# Patient Record
Sex: MALE | Race: WHITE | NOT HISPANIC OR LATINO | URBAN - METROPOLITAN AREA
[De-identification: names, ages, dates, MRNs, and addresses within clinical notes are randomized per-mention and may not be internally consistent; named-entity substitution may affect disease eponyms.]

---

## 2018-07-31 ENCOUNTER — EMERGENCY (EMERGENCY)
Facility: HOSPITAL | Age: 83
LOS: 1 days | Discharge: HOME | End: 2018-07-31
Attending: EMERGENCY MEDICINE
Payer: MEDICARE

## 2018-07-31 VITALS
HEART RATE: 88 BPM | DIASTOLIC BLOOD PRESSURE: 82 MMHG | OXYGEN SATURATION: 100 % | SYSTOLIC BLOOD PRESSURE: 176 MMHG | RESPIRATION RATE: 20 BRPM | TEMPERATURE: 98 F

## 2018-07-31 VITALS — HEIGHT: 69 IN | WEIGHT: 160.06 LBS

## 2018-07-31 PROCEDURE — 99283 EMERGENCY DEPT VISIT LOW MDM: CPT

## 2018-07-31 PROCEDURE — 71045 X-RAY EXAM CHEST 1 VIEW: CPT | Mod: 26

## 2018-07-31 PROCEDURE — 99284 EMERGENCY DEPT VISIT MOD MDM: CPT | Mod: 25

## 2018-07-31 PROCEDURE — 70360 X-RAY EXAM OF NECK: CPT

## 2018-07-31 PROCEDURE — 71045 X-RAY EXAM CHEST 1 VIEW: CPT

## 2018-07-31 PROCEDURE — 70360 X-RAY EXAM OF NECK: CPT | Mod: 26

## 2018-07-31 NOTE — ED STATDOCS - PROGRESS NOTE DETAILS
Patient is a 89 y/o male c/o of foreign body swallowed. Patient states he swallowed his bridge and has been having trouble swallowing since. Patient to main further evaluation

## 2018-07-31 NOTE — ED PROVIDER NOTE - OBJECTIVE STATEMENT
A 89 year old male pt presents to the ED c/o choking episode. The pt was eating dinner when he accidentally swallowed his dentures, causing him to experience difficulty breathing and coughing. Pt was able to recover after coughing up his food but was unsure if his dentures were still in his throat. The pt's son was able to fine the dentures in the garbage after the incident. He currently denies any throat pain or difficulty breathing. denies fever. denies HA or neck pain. no chest pain or sob. no abd pain. no n/v/d. no urinary f/u/d. no back pain. no motor or sensory deficits. denies illicit drug use. no recent travel. no rash. no other acute issues symptoms or concerns

## 2018-07-31 NOTE — ED PROVIDER NOTE - MEDICAL DECISION MAKING DETAILS
feeling better return to ed for intractable chest pain sob any overall worsening son came to room and states he found rest of bridge on the floor at pts house thus he likely didn't swallow it and xray shows no foreign body pt tolerating secretions in nad able to swallow fluids without distress

## 2018-07-31 NOTE — ED PROVIDER NOTE - PSH
S/P cataract extraction  surgical correction for glaucoma  S/P prostatectomy  excision basal cell L neck

## 2018-09-28 ENCOUNTER — OUTPATIENT (OUTPATIENT)
Dept: OUTPATIENT SERVICES | Facility: HOSPITAL | Age: 83
LOS: 1 days | End: 2018-09-28
Payer: MEDICARE

## 2018-09-28 DIAGNOSIS — Z51.89 ENCOUNTER FOR OTHER SPECIFIED AFTERCARE: ICD-10-CM

## 2018-09-28 DIAGNOSIS — M25.559 PAIN IN UNSPECIFIED HIP: ICD-10-CM

## 2018-10-30 PROCEDURE — 97161 PT EVAL LOW COMPLEX 20 MIN: CPT

## 2018-10-30 PROCEDURE — 97110 THERAPEUTIC EXERCISES: CPT

## 2018-10-30 PROCEDURE — 97010 HOT OR COLD PACKS THERAPY: CPT

## 2018-10-30 PROCEDURE — G8979: CPT | Mod: CJ

## 2018-10-30 PROCEDURE — G8978: CPT | Mod: CK

## 2018-11-28 ENCOUNTER — TRANSCRIPTION ENCOUNTER (OUTPATIENT)
Age: 83
End: 2018-11-28

## 2018-12-07 ENCOUNTER — TRANSCRIPTION ENCOUNTER (OUTPATIENT)
Age: 83
End: 2018-12-07

## 2019-01-17 ENCOUNTER — APPOINTMENT (OUTPATIENT)
Dept: ORTHOPEDIC SURGERY | Facility: CLINIC | Age: 84
End: 2019-01-17
Payer: MEDICARE

## 2019-01-17 VITALS
WEIGHT: 152 LBS | SYSTOLIC BLOOD PRESSURE: 143 MMHG | HEART RATE: 95 BPM | BODY MASS INDEX: 24.43 KG/M2 | HEIGHT: 66 IN | DIASTOLIC BLOOD PRESSURE: 85 MMHG

## 2019-01-17 DIAGNOSIS — M67.98 UNSPECIFIED DISORDER OF SYNOVIUM AND TENDON, OTHER SITE: ICD-10-CM

## 2019-01-17 PROBLEM — Z00.00 ENCOUNTER FOR PREVENTIVE HEALTH EXAMINATION: Status: ACTIVE | Noted: 2019-01-17

## 2019-01-17 PROCEDURE — 99204 OFFICE O/P NEW MOD 45 MIN: CPT | Mod: 25

## 2019-01-17 PROCEDURE — 20552 NJX 1/MLT TRIGGER POINT 1/2: CPT

## 2019-01-17 NOTE — PHYSICAL EXAM
[de-identified] : Lumbar exam:\par CONSTITUTIONAL: The patient is a very pleasant individual who is well-nourished and who appears stated age. He is accompanied by his son. Ambulates with an antalgic, unsteady gait.\par PSYCHIATRIC: The patient is alert and oriented X 3 and in no apparent distress, and participates with orthopedic evaluation well.\par HEAD: Atraumatic and is nonsyndromic in appearance.\par EENT: No visible thyromegaly, EOMI.\par RESPIRATORY: Respiratory rate is regular, not dyspneic on examination.\par LYMPHATICS: There is no inguinal lymphadenopathy\par INTEGUMENTARY: Skin is clean, dry, and intact about the bilateral lower extremities and lumbar spine.\par VASCULAR: There is brisk capillary refill about the bilateral lower extremities.\par NEUROLOGIC: There are no pathologic reflexes. There is no decrease in sensation of the bilateral lower extremities on Wartenberg pinwheel examination. Deep tendon reflexes are well maintained at 2+/4 of the bilateral lower extremities and are symmetric..\par MUSCULOSKELETAL: There is no visible muscular atrophy. Manual motor strength is somewhat maintained in the bilateral lower extremities. Range of motion of lumbar spine is well maintained. Negative tension sign and straight leg raise bilaterally. Quad extension, ankle dorsiflexion, EHL, plantar flexion, and ankle eversion are well preserved. Normal secondary orthopaedic exam of bilateral knees and ankles.  Tenderness to right greater than left her trochanter. Positive tear form a strain and sprain type characteristics as well as focal tenderness to the gluteal insertion on the right. 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

## 2019-01-17 NOTE — REASON FOR VISIT
[Initial Visit] : an initial visit for [Back Pain] : back pain [Sciatica] : sciatica [Other: ____] : [unfilled]

## 2019-01-17 NOTE — PROCEDURE
[de-identified] : Verbal consent was obtained and all questions, comments and concerns were addressed.  After trigger point in the affected area superficially into affected muscle right piriformis was cleansed with alcohol prep X 3, ethyl chloride was used as local anesthetic.  Under sterile precautions 1cc of 1 percent lidocaine without epinephrine, plus 10 mg ketorolac were instilled into the affected trigger points.  Patient tolerated procedure well. Dry sterile dressing was placed and patient described relief of pain 5 minutes after procedure was performed.\par

## 2019-01-17 NOTE — HISTORY OF PRESENT ILLNESS
[de-identified] : He is status post slip and fall in September of 2018 resulting in severe bilateral buttock pain right greater than left, as well as right lateral hip pain which does radiate to the right thigh and sometimes even into the calf. Pain is exacerbated when he gets up from bed and is made better after he is walking for some time. He did 5 weeks of active physical therapy beginning in the end of September 2018 and continues to do home exercise however her pain continues to rate is 7 or 8 on pain scale on a regular basis. It's described as very sore. He did have an x-ray at an outside facility. He is not having any back pain at this time .  He doesn't past medical history prostate cancer radical prostatectomy occurred approximately 10 years ago. States that he has had no recurrence since then.

## 2019-01-17 NOTE — DISCUSSION/SUMMARY
[Medication Risks Reviewed] : Medication risks reviewed [de-identified] : MRI of the right hip is ordered and I think medically necessary to do pain that continues past 4 months status post fall, possible subacute fracture. Patient also has history of prostate cancer. He did well after trigger point injection. 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 In Heather Tylenol, heat, topicals p.r.n. He'll continue home exercises from physical therapy. I will call him with the results of his MRI.

## 2019-01-18 ENCOUNTER — OUTPATIENT (OUTPATIENT)
Dept: OUTPATIENT SERVICES | Facility: HOSPITAL | Age: 84
LOS: 1 days | End: 2019-01-18

## 2019-01-18 ENCOUNTER — APPOINTMENT (OUTPATIENT)
Dept: MRI IMAGING | Facility: CLINIC | Age: 84
End: 2019-01-18
Payer: MEDICARE

## 2019-01-18 DIAGNOSIS — M67.98 UNSPECIFIED DISORDER OF SYNOVIUM AND TENDON, OTHER SITE: ICD-10-CM

## 2019-01-18 PROCEDURE — 73721 MRI JNT OF LWR EXTRE W/O DYE: CPT | Mod: 26,RT

## 2019-01-25 ENCOUNTER — APPOINTMENT (OUTPATIENT)
Dept: ORTHOPEDIC SURGERY | Facility: CLINIC | Age: 84
End: 2019-01-25
Payer: MEDICARE

## 2019-01-25 ENCOUNTER — APPOINTMENT (OUTPATIENT)
Dept: MRI IMAGING | Facility: CLINIC | Age: 84
End: 2019-01-25
Payer: MEDICARE

## 2019-01-25 ENCOUNTER — OUTPATIENT (OUTPATIENT)
Dept: OUTPATIENT SERVICES | Facility: HOSPITAL | Age: 84
LOS: 1 days | End: 2019-01-25

## 2019-01-25 VITALS
BODY MASS INDEX: 24.43 KG/M2 | WEIGHT: 152 LBS | DIASTOLIC BLOOD PRESSURE: 71 MMHG | SYSTOLIC BLOOD PRESSURE: 124 MMHG | HEART RATE: 86 BPM | HEIGHT: 66 IN

## 2019-01-25 DIAGNOSIS — Z78.9 OTHER SPECIFIED HEALTH STATUS: ICD-10-CM

## 2019-01-25 DIAGNOSIS — Z86.79 PERSONAL HISTORY OF OTHER DISEASES OF THE CIRCULATORY SYSTEM: ICD-10-CM

## 2019-01-25 DIAGNOSIS — Z85.46 PERSONAL HISTORY OF MALIGNANT NEOPLASM OF PROSTATE: ICD-10-CM

## 2019-01-25 DIAGNOSIS — M54.16 RADICULOPATHY, LUMBAR REGION: ICD-10-CM

## 2019-01-25 DIAGNOSIS — Z80.9 FAMILY HISTORY OF MALIGNANT NEOPLASM, UNSPECIFIED: ICD-10-CM

## 2019-01-25 DIAGNOSIS — S32.10XA UNSPECIFIED FRACTURE OF SACRUM, INITIAL ENCOUNTER FOR CLOSED FRACTURE: ICD-10-CM

## 2019-01-25 DIAGNOSIS — M99.83 OTHER BIOMECHANICAL LESIONS OF LUMBAR REGION: ICD-10-CM

## 2019-01-25 DIAGNOSIS — G57.01 LESION OF SCIATIC NERVE, RIGHT LOWER LIMB: ICD-10-CM

## 2019-01-25 PROCEDURE — 72148 MRI LUMBAR SPINE W/O DYE: CPT | Mod: 26

## 2019-01-25 PROCEDURE — 99214 OFFICE O/P EST MOD 30 MIN: CPT | Mod: 25

## 2019-01-25 PROCEDURE — 96372 THER/PROPH/DIAG INJ SC/IM: CPT

## 2019-01-25 PROCEDURE — 72100 X-RAY EXAM L-S SPINE 2/3 VWS: CPT

## 2019-01-25 RX ORDER — OMEPRAZOLE 40 MG/1
40 CAPSULE, DELAYED RELEASE ORAL
Qty: 30 | Refills: 1 | Status: ACTIVE | COMMUNITY
Start: 2019-01-25 | End: 1900-01-01

## 2019-01-25 RX ORDER — NAPROXEN 500 MG/1
500 TABLET ORAL
Qty: 30 | Refills: 0 | Status: ACTIVE | COMMUNITY
Start: 2019-01-25 | End: 1900-01-01

## 2019-01-25 RX ORDER — AMOXICILLIN 875 MG/1
TABLET, FILM COATED ORAL
Refills: 0 | Status: ACTIVE | COMMUNITY

## 2019-01-25 NOTE — HISTORY OF PRESENT ILLNESS
[Standing] : worsened by standing [Recumbency] : relieved by recumbency [de-identified] : 89 year old M presents for review of his hip MRI. He has having some right hip pain. His pain starts in his right buttock and radiates down to his right calf. His pain is at worst from sitting to standing.  [Ataxia] : no ataxia [Incontinence] : no incontinence [Loss of Dexterity] : good dexterity [Urinary Ret.] : no urinary retention

## 2019-01-25 NOTE — ADDENDUM
[FreeTextEntry1] : Documented by Seth Suarez acting as a scribe for JESUS Nieves on 01/25/2019\par All medical record entries made by the Scribe were at my, JESUS Nieves, direction and personally dictated by me on 01/25/2019 . I have reviewed the chart and agree that the record accurately reflects my personal performance of the history, physical exam, assessment and plan. I have also personally directed, reviewed, and agreed with the chart.

## 2019-01-25 NOTE — PHYSICAL EXAM
[Poor Appearance] : well-appearing [Acute Distress] : not in acute distress [Obese] : not obese [de-identified] : CONSTITUTIONAL: The patient is a very pleasant individual who is well-nourished and who appears stated age. presents in a wheelchair and accompanied by his son. \par PSYCHIATRIC: The patient is alert and oriented X 3 and in no apparent distress, and participates with orthopedic evaluation well.\par HEAD: Atraumatic and is nonsyndromic in appearance.\par EENT: No visible thyromegaly, EOMI.\par RESPIRATORY: Respiratory rate is regular, not dyspneic on examination.\par LYMPHATICS: There is no inguinal lymphadenopathy\par INTEGUMENTARY: Skin is clean, dry, and intact about the bilateral lower extremities and lumbar spine.\par VASCULAR: There is brisk capillary refill about the bilateral lower extremities.\par NEUROLOGIC: There are no pathologic reflexes. There is no decrease in sensation of the bilateral lower extremities on Wartenberg pinwheel examination. Deep tendon reflexes are well maintained at 2+/4 of the bilateral lower extremities and are symmetric.\par MUSCULOSKELETAL: There is no visible muscular atrophy. Manual motor strength is well maintained in the bilateral lower extremities. Range of motion of lumbar spine is well maintained. The patient ambulates in a non-myelopathic manner. Negative tension sign and straight leg raise bilaterally. Quad extension, ankle dorsiflexion, EHL, plantar flexion, and ankle eversion are well preserved. Normal secondary orthopaedic exam of bilateral hips, greater trochanteric area, knees and ankles \par \par TTP about lower lumbar spine. pain on lumbar flexion greater than 20 degrees. TTP greater trochanteric on the right. piriformis strain type characteristics on the right. pain on seated to standing across low back and RLE.  [de-identified] : X-ray of the lumbar spine taken today shows loss of lumbar lordosis, increase in intervertebral disc space L4-S1, foraminal stenosis. \par \par MRI of the right hip 1/18/19 at [facility] shows mild .gt bursitis as well as adductor tendinitis, mild arthritis of hip joint, and some end plate bone Hoh edema L2-L3, L3-L4, no hip fx.

## 2019-01-25 NOTE — PROCEDURE
[de-identified] : Verbal consent was obtained and all questions, comments and concerns were addressed. Right buttock was cleansed with alcohol prep X 3, ethyl chloride was used as local anesthetic. Under sterile precautions 30mg of Ketorolac were instilled in to the right buttock under sterile precautions. he tolerated procedure well. Dry sterile dressing was placed and patient described relief of pain 5 minutes after procedure was performed.

## 2019-01-25 NOTE — REVIEW OF SYSTEMS
[Joint Pain] : joint pain [Negative] : Heme/Lymph [Fever] : no fever [Chills] : no chills [Cough] : no cough [SOB on Exertion] : no shortness of breath on exertion

## 2019-01-29 ENCOUNTER — OTHER (OUTPATIENT)
Age: 84
End: 2019-01-29

## 2019-01-29 DIAGNOSIS — M48.062 SPINAL STENOSIS, LUMBAR REGION WITH NEUROGENIC CLAUDICATION: ICD-10-CM

## 2019-01-29 RX ORDER — METHYLPREDNISOLONE 4 MG/1
4 TABLET ORAL
Qty: 1 | Refills: 1 | Status: ACTIVE | COMMUNITY
Start: 2019-01-29 | End: 1900-01-01

## 2019-02-08 ENCOUNTER — APPOINTMENT (OUTPATIENT)
Dept: ORTHOPEDIC SURGERY | Facility: CLINIC | Age: 84
End: 2019-02-08

## 2020-07-05 ENCOUNTER — INPATIENT (INPATIENT)
Facility: HOSPITAL | Age: 85
LOS: 4 days | Discharge: NURSING, REHAB OR LONG-TERM CARE | DRG: 557 | End: 2020-07-10
Attending: HOSPITALIST | Admitting: HOSPITALIST
Payer: MEDICARE

## 2020-07-05 VITALS
RESPIRATION RATE: 18 BRPM | TEMPERATURE: 98 F | OXYGEN SATURATION: 98 % | WEIGHT: 149.91 LBS | DIASTOLIC BLOOD PRESSURE: 86 MMHG | HEART RATE: 87 BPM | SYSTOLIC BLOOD PRESSURE: 135 MMHG

## 2020-07-05 LAB
ANION GAP SERPL CALC-SCNC: 12 MMOL/L — SIGNIFICANT CHANGE UP (ref 5–17)
BASOPHILS # BLD AUTO: 0.04 K/UL — SIGNIFICANT CHANGE UP (ref 0–0.2)
BASOPHILS NFR BLD AUTO: 0.4 % — SIGNIFICANT CHANGE UP (ref 0–2)
BUN SERPL-MCNC: 29 MG/DL — HIGH (ref 8–20)
CALCIUM SERPL-MCNC: 9.6 MG/DL — SIGNIFICANT CHANGE UP (ref 8.6–10.2)
CHLORIDE SERPL-SCNC: 91 MMOL/L — LOW (ref 98–107)
CK SERPL-CCNC: 1851 U/L — HIGH (ref 30–200)
CO2 SERPL-SCNC: 25 MMOL/L — SIGNIFICANT CHANGE UP (ref 22–29)
CREAT SERPL-MCNC: 1 MG/DL — SIGNIFICANT CHANGE UP (ref 0.5–1.3)
EOSINOPHIL # BLD AUTO: 0.37 K/UL — SIGNIFICANT CHANGE UP (ref 0–0.5)
EOSINOPHIL NFR BLD AUTO: 3.8 % — SIGNIFICANT CHANGE UP (ref 0–6)
GLUCOSE SERPL-MCNC: 109 MG/DL — HIGH (ref 70–99)
HCT VFR BLD CALC: 33.1 % — LOW (ref 39–50)
HGB BLD-MCNC: 11.2 G/DL — LOW (ref 13–17)
IMM GRANULOCYTES NFR BLD AUTO: 0.3 % — SIGNIFICANT CHANGE UP (ref 0–1.5)
LYMPHOCYTES # BLD AUTO: 0.82 K/UL — LOW (ref 1–3.3)
LYMPHOCYTES # BLD AUTO: 8.5 % — LOW (ref 13–44)
MCHC RBC-ENTMCNC: 32.1 PG — SIGNIFICANT CHANGE UP (ref 27–34)
MCHC RBC-ENTMCNC: 33.8 GM/DL — SIGNIFICANT CHANGE UP (ref 32–36)
MCV RBC AUTO: 94.8 FL — SIGNIFICANT CHANGE UP (ref 80–100)
MONOCYTES # BLD AUTO: 0.55 K/UL — SIGNIFICANT CHANGE UP (ref 0–0.9)
MONOCYTES NFR BLD AUTO: 5.7 % — SIGNIFICANT CHANGE UP (ref 2–14)
NEUTROPHILS # BLD AUTO: 7.87 K/UL — HIGH (ref 1.8–7.4)
NEUTROPHILS NFR BLD AUTO: 81.3 % — HIGH (ref 43–77)
PLATELET # BLD AUTO: 248 K/UL — SIGNIFICANT CHANGE UP (ref 150–400)
POTASSIUM SERPL-MCNC: 5.1 MMOL/L — SIGNIFICANT CHANGE UP (ref 3.5–5.3)
POTASSIUM SERPL-SCNC: 5.1 MMOL/L — SIGNIFICANT CHANGE UP (ref 3.5–5.3)
RBC # BLD: 3.49 M/UL — LOW (ref 4.2–5.8)
RBC # FLD: 12.3 % — SIGNIFICANT CHANGE UP (ref 10.3–14.5)
SODIUM SERPL-SCNC: 128 MMOL/L — LOW (ref 135–145)
WBC # BLD: 9.68 K/UL — SIGNIFICANT CHANGE UP (ref 3.8–10.5)
WBC # FLD AUTO: 9.68 K/UL — SIGNIFICANT CHANGE UP (ref 3.8–10.5)

## 2020-07-05 PROCEDURE — 73080 X-RAY EXAM OF ELBOW: CPT | Mod: 26,RT

## 2020-07-05 PROCEDURE — 73090 X-RAY EXAM OF FOREARM: CPT | Mod: 26,LT

## 2020-07-05 PROCEDURE — 73060 X-RAY EXAM OF HUMERUS: CPT | Mod: 26,RT

## 2020-07-05 PROCEDURE — 72125 CT NECK SPINE W/O DYE: CPT | Mod: 26

## 2020-07-05 PROCEDURE — 70450 CT HEAD/BRAIN W/O DYE: CPT | Mod: 26

## 2020-07-05 PROCEDURE — 99285 EMERGENCY DEPT VISIT HI MDM: CPT | Mod: CS

## 2020-07-05 PROCEDURE — 73110 X-RAY EXAM OF WRIST: CPT | Mod: 26,RT

## 2020-07-05 RX ORDER — SODIUM CHLORIDE 9 MG/ML
1000 INJECTION INTRAMUSCULAR; INTRAVENOUS; SUBCUTANEOUS ONCE
Refills: 0 | Status: COMPLETED | OUTPATIENT
Start: 2020-07-05 | End: 2020-07-05

## 2020-07-05 NOTE — ED PROVIDER NOTE - NS ED ROS FT
General: Denies fever, chills  HEENT: Denies sensory changes, sore throat  Neck: Denies neck pain, neck stiffness  Resp: Denies coughing, SOB  Cardiovascular: Denies CP, palpitations, LE edema  GI: Denies nausea, vomiting, abdominal pain, diarrhea, constipation, blood in stool  : Denies dysuria, hematuria, frequency, incontinence  MSK: Endorses RUE, hip pain  Neuro: Denies HA, dizziness, numbness, weakness  Skin: Denies rashes

## 2020-07-05 NOTE — ED PROVIDER NOTE - ATTENDING CONTRIBUTION TO CARE
I personally saw the patient with the resident, and completed the key components of the history and physical exam. I then discussed the management plan with the resident.   gen gcs 15 heent + small hematoma parietla region nnon expanding resp clear cardiac no mumnur abd sot neuro: CN II - XII intact, EOMI, PERRL, no papilledema, 5/5 muscle strength x 4 extremities, no sensory deficits, 2+ dtr globally, negative babinski, no ataxic gait, normal FELIX and FNT, normal romberg   passed mabulation trial  return to ed for intractable HA, persistent vomiting, or new onset motor/sensory deficits

## 2020-07-05 NOTE — ED PROVIDER NOTE - CLINICAL SUMMARY MEDICAL DECISION MAKING FREE TEXT BOX
Patient presenting after a fall with unknown down time. Bruising to RUE. Will image head and RUE. Basic labwork to be drawn due to unknown downtime. Patient dealing with chronic hip and thigh pain.

## 2020-07-05 NOTE — ED PROVIDER NOTE - PHYSICAL EXAMINATION
General: Well appearing male in no acute distress  HEENT: Normocephalic, atraumatic. Moist mucous membranes. Oropharynx clear. No lymphadenopathy.  Eyes: No scleral icterus. EOMI. BERTO.  Neck:. Soft and supple. Full ROM without pain. No midline tenderness  Cardiac: Regular rate and regular rhythm. No murmurs, rubs, gallops. Peripheral pulses 2+ and symmetric. No LE edema.  Resp: Lungs CTAB. Speaking in full sentences. No wheezes, rales or rhonchi.  Abd: Soft, non-tender, non-distended. No guarding or rebound. No scars, masses, or lesions.  Back: Spine midline and non-tender. No CVA tenderness.    Skin: Bruising to R elbow, forearm, wrist.  Neuro: AO x 3. 5/5 strength all extremities. Sensation to light touch intact. General: Well appearing male in no acute distress  HEENT: Normocephalic, atraumatic. Moist mucous membranes. Oropharynx clear. No lymphadenopathy.  Eyes: No scleral icterus. EOMI. BERTO.  Neck:. Soft and supple. Full ROM without pain. No midline tenderness  Cardiac: Regular rate and regular rhythm. No murmurs, rubs, gallops. Peripheral pulses 2+ and symmetric. No LE edema.  Resp: Lungs CTAB. Speaking in full sentences. No wheezes, rales or rhonchi.  Abd: Soft, non-tender, non-distended. No guarding or rebound. No scars, masses, or lesions.  MSK: Tenderness to palpation of RUE. Tenderness to palpation of thighs bilaterally.  Skin: Bruising to R elbow, forearm, wrist.  Neuro: AO x 3. 5/5 strength all extremities. Sensation to light touch intact.

## 2020-07-05 NOTE — ED PROVIDER NOTE - PROGRESS NOTE DETAILS
Spoke with pt's son. [phone]. Last saw patient yesterday and there were no issues. Found patient on floor in front of bathroom only saturated diaper on. Pt has chronic bilateral hip pain. Has had MRI, pain mgmt. Takes BP meds and low dose ASA. Pt lives alone. Son and daughter assist patient. New injury to R arm. Patient seemed a little confused to son. Believes patient had been down for at least 3 hours. States that patient would be safe to d/c to home should workup be negative. Attempted to ambulate patient. Unable to stand with assistance. Will keep for PT eval in AM. Labwork showing elevated CK. To give IVF for hydration. To place in Obs. Spoke with son regarding patient. Explained that patient should stay in hospital for PT eval. Son understands.

## 2020-07-05 NOTE — ED ADULT NURSE NOTE - OBJECTIVE STATEMENT
patient A&Ox2, alert to self and situation. BIBA s/P fall, found on floor at home by son. patient states he usually ambulates steadily at home with a cane. reports tripping and falling over a rug earlier today. bruising noted to right shoulder and arm.

## 2020-07-05 NOTE — ED PROVIDER NOTE - OBJECTIVE STATEMENT
Pt is a 89yo M who had a fall earlier today. Pt states that he tripped over a rug and that he could not get up. Patient was found by son several hours later. Patient endorses chronic bilateral hip and thigh pain for which he sees many doctors. Patient endorses RUE pain. Denies palpitations or syncope. Denies CP, SOB, headache, confusion, nausea, vomiting, weakness.

## 2020-07-05 NOTE — ED ADULT TRIAGE NOTE - CHIEF COMPLAINT QUOTE
as per ems patient was found on floor by son, ems states that patient has not been seen today by son, unknown last known well. patient with complaints of pain to left hip, patient with bruising to shoulder and arms. patient denies hitting his head denies any blood thinners. patient states that he thinks he tripped over the rug,

## 2020-07-06 DIAGNOSIS — Z86.69 PERSONAL HISTORY OF OTHER DISEASES OF THE NERVOUS SYSTEM AND SENSE ORGANS: Chronic | ICD-10-CM

## 2020-07-06 DIAGNOSIS — R41.82 ALTERED MENTAL STATUS, UNSPECIFIED: ICD-10-CM

## 2020-07-06 DIAGNOSIS — Z94.7 CORNEAL TRANSPLANT STATUS: Chronic | ICD-10-CM

## 2020-07-06 LAB
ANION GAP SERPL CALC-SCNC: 13 MMOL/L — SIGNIFICANT CHANGE UP (ref 5–17)
APPEARANCE UR: CLEAR — SIGNIFICANT CHANGE UP
BACTERIA # UR AUTO: ABNORMAL
BILIRUB UR-MCNC: NEGATIVE — SIGNIFICANT CHANGE UP
BUN SERPL-MCNC: 31 MG/DL — HIGH (ref 8–20)
CALCIUM SERPL-MCNC: 8.9 MG/DL — SIGNIFICANT CHANGE UP (ref 8.6–10.2)
CHLORIDE SERPL-SCNC: 94 MMOL/L — LOW (ref 98–107)
CK MB CFR SERPL CALC: 19.6 NG/ML — HIGH (ref 0–6.7)
CK MB CFR SERPL CALC: 26.5 NG/ML — HIGH (ref 0–6.7)
CK SERPL-CCNC: 1240 U/L — HIGH (ref 30–200)
CO2 SERPL-SCNC: 22 MMOL/L — SIGNIFICANT CHANGE UP (ref 22–29)
COLOR SPEC: YELLOW — SIGNIFICANT CHANGE UP
CREAT SERPL-MCNC: 1.02 MG/DL — SIGNIFICANT CHANGE UP (ref 0.5–1.3)
DIFF PNL FLD: ABNORMAL
EPI CELLS # UR: SIGNIFICANT CHANGE UP
GLUCOSE SERPL-MCNC: 99 MG/DL — SIGNIFICANT CHANGE UP (ref 70–99)
GLUCOSE UR QL: NEGATIVE MG/DL — SIGNIFICANT CHANGE UP
KETONES UR-MCNC: ABNORMAL
LEUKOCYTE ESTERASE UR-ACNC: NEGATIVE — SIGNIFICANT CHANGE UP
NITRITE UR-MCNC: NEGATIVE — SIGNIFICANT CHANGE UP
OSMOLALITY SERPL: 293 MOSMOL/KG — SIGNIFICANT CHANGE UP (ref 280–301)
OSMOLALITY SERPL: 297 MOSMOL/KG — SIGNIFICANT CHANGE UP (ref 280–301)
OSMOLALITY UR: 587 MOSM/KG — SIGNIFICANT CHANGE UP (ref 300–1000)
PH UR: 5 — SIGNIFICANT CHANGE UP (ref 5–8)
POTASSIUM SERPL-MCNC: 4.6 MMOL/L — SIGNIFICANT CHANGE UP (ref 3.5–5.3)
POTASSIUM SERPL-SCNC: 4.6 MMOL/L — SIGNIFICANT CHANGE UP (ref 3.5–5.3)
PROT UR-MCNC: 100 MG/DL
RBC CASTS # UR COMP ASSIST: SIGNIFICANT CHANGE UP /HPF (ref 0–4)
SODIUM SERPL-SCNC: 129 MMOL/L — LOW (ref 135–145)
SODIUM UR-SCNC: 39 MMOL/L — SIGNIFICANT CHANGE UP
SP GR SPEC: 1.02 — SIGNIFICANT CHANGE UP (ref 1.01–1.02)
TSH SERPL-MCNC: 2.67 UIU/ML — SIGNIFICANT CHANGE UP (ref 0.27–4.2)
UROBILINOGEN FLD QL: NEGATIVE MG/DL — SIGNIFICANT CHANGE UP
WBC UR QL: SIGNIFICANT CHANGE UP

## 2020-07-06 PROCEDURE — 73522 X-RAY EXAM HIPS BI 3-4 VIEWS: CPT | Mod: 26

## 2020-07-06 PROCEDURE — 99223 1ST HOSP IP/OBS HIGH 75: CPT | Mod: AI

## 2020-07-06 PROCEDURE — 99234 HOSP IP/OBS SM DT SF/LOW 45: CPT | Mod: CS

## 2020-07-06 PROCEDURE — 71046 X-RAY EXAM CHEST 2 VIEWS: CPT | Mod: 26,CS

## 2020-07-06 RX ORDER — SODIUM CHLORIDE 9 MG/ML
1000 INJECTION INTRAMUSCULAR; INTRAVENOUS; SUBCUTANEOUS
Refills: 0 | Status: DISCONTINUED | OUTPATIENT
Start: 2020-07-06 | End: 2020-07-07

## 2020-07-06 RX ORDER — ASPIRIN/CALCIUM CARB/MAGNESIUM 324 MG
81 TABLET ORAL DAILY
Refills: 0 | Status: DISCONTINUED | OUTPATIENT
Start: 2020-07-06 | End: 2020-07-10

## 2020-07-06 RX ORDER — LOSARTAN POTASSIUM 100 MG/1
1 TABLET, FILM COATED ORAL
Qty: 0 | Refills: 0 | DISCHARGE

## 2020-07-06 RX ORDER — ASPIRIN/CALCIUM CARB/MAGNESIUM 324 MG
1 TABLET ORAL
Qty: 0 | Refills: 0 | DISCHARGE

## 2020-07-06 RX ORDER — LOSARTAN POTASSIUM 100 MG/1
50 TABLET, FILM COATED ORAL DAILY
Refills: 0 | Status: DISCONTINUED | OUTPATIENT
Start: 2020-07-06 | End: 2020-07-07

## 2020-07-06 RX ORDER — SENNA PLUS 8.6 MG/1
2 TABLET ORAL AT BEDTIME
Refills: 0 | Status: DISCONTINUED | OUTPATIENT
Start: 2020-07-06 | End: 2020-07-10

## 2020-07-06 RX ORDER — ENOXAPARIN SODIUM 100 MG/ML
40 INJECTION SUBCUTANEOUS DAILY
Refills: 0 | Status: DISCONTINUED | OUTPATIENT
Start: 2020-07-06 | End: 2020-07-10

## 2020-07-06 RX ORDER — DESMOPRESSIN ACETATE 0.1 MG/1
0.2 TABLET ORAL ONCE
Refills: 0 | Status: DISCONTINUED | OUTPATIENT
Start: 2020-07-06 | End: 2020-07-06

## 2020-07-06 RX ORDER — POLYETHYLENE GLYCOL 3350 17 G/17G
17 POWDER, FOR SOLUTION ORAL DAILY
Refills: 0 | Status: DISCONTINUED | OUTPATIENT
Start: 2020-07-06 | End: 2020-07-10

## 2020-07-06 RX ORDER — DESMOPRESSIN ACETATE 0.1 MG/1
2 TABLET ORAL ONCE
Refills: 0 | Status: COMPLETED | OUTPATIENT
Start: 2020-07-06 | End: 2020-07-06

## 2020-07-06 RX ORDER — HALOPERIDOL DECANOATE 100 MG/ML
2.5 INJECTION INTRAMUSCULAR ONCE
Refills: 0 | Status: COMPLETED | OUTPATIENT
Start: 2020-07-06 | End: 2020-07-06

## 2020-07-06 RX ADMIN — Medication 1 TABLET(S): at 17:15

## 2020-07-06 RX ADMIN — HALOPERIDOL DECANOATE 2.5 MILLIGRAM(S): 100 INJECTION INTRAMUSCULAR at 01:17

## 2020-07-06 RX ADMIN — SODIUM CHLORIDE 75 MILLILITER(S): 9 INJECTION INTRAMUSCULAR; INTRAVENOUS; SUBCUTANEOUS at 16:25

## 2020-07-06 RX ADMIN — SODIUM CHLORIDE 2000 MILLILITER(S): 9 INJECTION INTRAMUSCULAR; INTRAVENOUS; SUBCUTANEOUS at 00:54

## 2020-07-06 RX ADMIN — POLYETHYLENE GLYCOL 3350 17 GRAM(S): 17 POWDER, FOR SOLUTION ORAL at 16:28

## 2020-07-06 RX ADMIN — LOSARTAN POTASSIUM 50 MILLIGRAM(S): 100 TABLET, FILM COATED ORAL at 06:25

## 2020-07-06 RX ADMIN — ENOXAPARIN SODIUM 40 MILLIGRAM(S): 100 INJECTION SUBCUTANEOUS at 17:15

## 2020-07-06 RX ADMIN — Medication 81 MILLIGRAM(S): at 12:38

## 2020-07-06 RX ADMIN — DESMOPRESSIN ACETATE 2 MICROGRAM(S): 0.1 TABLET ORAL at 09:03

## 2020-07-06 NOTE — CHART NOTE - NSCHARTNOTEFT_GEN_A_CORE
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  SON HAS ALREADY PUT THINGS IN PLACE TO HAVE 24/7 SUPERVISION FOR HIS DAD AS HE WAS  NOTED TO BE DECLINING RECENTLY.  THE SON- CL AND THE DAUGHTER- JESSE ARE BOTH GOING TO BE ASSUMING CAR FOR THEIR DAD UNITL THEIR COUSIN ARRIVES WHOM WILL BE MOVING IN WITH THE PATIENT AND ALSO ASSISITNG IN CARE AND SUPERVISION.   NO SW NEEDS NOTED AT THIS TIME/

## 2020-07-06 NOTE — H&P ADULT - NSICDXPASTSURGICALHX_GEN_ALL_CORE_FT
PAST SURGICAL HISTORY:  H/O: glaucoma     Post corneal transplant     S/P cataract extraction surgical correction for glaucoma    S/P prostatectomy excision basal cell L neck

## 2020-07-06 NOTE — ED ADULT NURSE REASSESSMENT NOTE - NSIMPLEMENTINTERV_GEN_ALL_ED
Implemented All Fall with Harm Risk Interventions:  [place] to call system. Call bell, personal items and telephone within reach. Instruct patient to call for assistance. Room bathroom lighting operational. Non-slip footwear when patient is off stretcher. Physically safe environment: no spills, clutter or unnecessary equipment. Stretcher in lowest position, wheels locked, appropriate side rails in place. Provide visual cue, wrist band, yellow gown, etc. Monitor gait and stability. Monitor for mental status changes and reorient to person, place, and time. Review medications for side effects contributing to fall risk. Reinforce activity limits and safety measures with patient and family. Provide visual clues: red socks.
Implemented All Fall with Harm Risk Interventions:  [place] to call system. Call bell, personal items and telephone within reach. Instruct patient to call for assistance. Room bathroom lighting operational. Non-slip footwear when patient is off stretcher. Physically safe environment: no spills, clutter or unnecessary equipment. Stretcher in lowest position, wheels locked, appropriate side rails in place. Provide visual cue, wrist band, yellow gown, etc. Monitor gait and stability. Monitor for mental status changes and reorient to person, place, and time. Review medications for side effects contributing to fall risk. Reinforce activity limits and safety measures with patient and family. Provide visual clues: red socks.

## 2020-07-06 NOTE — H&P ADULT - NSHPREVIEWOFSYSTEMS_GEN_ALL_CORE
Review of Systems:  CONSTITUTIONAL: No fevers or chills; +weakness  EYES/ENT: No visual changes;  No vertigo or throat pain   NECK: No pain or stiffness  RESPIRATORY: No cough, wheezing, hemoptysis; No shortness of breath,   CARDIOVASCULAR: No chest pain or palpitations  GASTROINTESTINAL: No abdominal or epigastric pain. No nausea, vomiting, or hematemesis; No diarrhea or constipation.   GENITOURINARY: No dysuria, frequency or hematuria  NEUROLOGICAL: No numbness or weakness  MSK +hip pain  SKIN: No itching, burning, rashes, or lesions   All other review of systems is negative unless indicated above

## 2020-07-06 NOTE — PROVIDER CONTACT NOTE (OTHER) - ASSESSMENT
Pt does not follow pain scale, denies pain but demonstrates moan with movement of right LE, specifically the right hip. Pt places his hand on his right groin. Pt requires max assist w/ mobility, stands with PT assist, see eval for details.

## 2020-07-06 NOTE — H&P ADULT - NSHPPHYSICALEXAM_GEN_ALL_CORE
Vital Signs Last 24 Hrs  T(F): 98.1 (06 Jul 2020 15:41), Max: 98.1 (06 Jul 2020 06:15)  HR: 90 (06 Jul 2020 15:41) (84 - 90)  BP: 169/79 (06 Jul 2020 15:41) (135/86 - 175/84)  RR: 18 (06 Jul 2020 15:41) (18 - 18)  SpO2: 96% (06 Jul 2020 15:41) (96% - 99%)    Physical Exam:  Constitutional: NAD, awake and alert, well-developed  Neck: Soft and supple, No LAD, No JVD  Respiratory: cta b/l no wheezing no rhonchi  Cardiovascular: +s1/s2 no edema b/l le  Gastrointestinal: soft nt nd bs+  Vascular: 2+ peripheral pulses  Neurological: A/O x 3, no focal deficits  Musculoskeletal: 5/5 strength b/l upper and lower extremities  : Contraindicated  Breasts: Contraindicated  Rectal: Contraindicated

## 2020-07-06 NOTE — ED ADULT NURSE REASSESSMENT NOTE - GENERAL PATIENT STATE
comfortable appearance
comfortable appearance/resting/sleeping/cooperative/smiling/interactive
comfortable appearance/cooperative/resting/sleeping/smiling/interactive
comfortable appearance/resting/sleeping
comfortable appearance/resting/sleeping/smiling/interactive/cooperative

## 2020-07-06 NOTE — PROVIDER CONTACT NOTE (OTHER) - RECOMMENDATIONS
subacute rehabilitation. Pt requires more detailed social history, unable to obtain at the time of eval due to cognition.

## 2020-07-06 NOTE — ED CDU PROVIDER DISPOSITION NOTE - ATTENDING CONTRIBUTION TO CARE
placed on observation for PT and CM evaluation after trip and fall.  Found to be very confused this morning on rounds stating "something is not right, feeling that something is being cut off".  Patient not oriented to place time or situation.  Labs reviewed and noted to have hyponatremia and elevated CPK.  Clinical examination suggest euvolemic hyponatremia:  [place] hyponatremia protocol initiated.  Given IV fluids for mild rhabdomyolysis.  No improvement in sodium level with DDAVP .  Admitted for further treatment
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
## 2020-07-06 NOTE — ED CDU PROVIDER INITIAL DAY NOTE - PHYSICAL EXAMINATION
General: Well appearing male in no acute distress  HEENT: Normocephalic, atraumatic. Moist mucous membranes. Oropharynx clear. No lymphadenopathy.  Eyes: No scleral icterus. EOMI. BERTO.  Neck:. Soft and supple. Full ROM without pain. No midline tenderness  Cardiac: Regular rate and regular rhythm. No murmurs, rubs, gallops. Peripheral pulses 2+ and symmetric. No LE edema.  Resp: Lungs CTAB. Speaking in full sentences. No wheezes, rales or rhonchi.  Abd: Soft, non-tender, non-distended. No guarding or rebound. No scars, masses, or lesions.  MSK: Tenderness to palpation of RUE. Tenderness to palpation of thighs bilaterally.  Skin: Bruising to R elbow, forearm, wrist.  Neuro: AO x 3. 5/5 strength all extremities. Sensation to light touch intact.

## 2020-07-06 NOTE — ED ADULT NURSE REASSESSMENT NOTE - NS ED NURSE REASSESS COMMENT FT1
Dr. Cuevas and myself attempted to ambulate patient with 2 person assist, unable to ambulate with steady gait. PT consult to be ordered for AM. son updated with plan of care. will continue to monitor.
pt cleaned and changed by SNAs. pt found to have blanchable redness noted to sacral area. no open wounds noted. will continue to reassess.
pt states he does not eat breakfast so he is not hungry. pt drank coffee, tolerated po intake well. will continue to reassess.
report given to FER Kamara in ED observation unit. pt transported to CDU6 in stable condition by RN.
assumed care of pt @ 0726. received pt sleeping on stretcher, easily arousable at this time, alert and oriented to self and time only, no acute distress. offers no complaints at this time. bruising noted to b/l UE. pt able to follow simple commands, remains calm and cooperative. will continue to reassess.
patient becoming increasingly confused at this time, attempted to get up without assistance and threatening to kick/hit staff when they approach him. unable to ambulate at this time. JESUS Becker called to bedside, patient medicated and moved closer to nursing station for protection.
Patient resting in stretcher, VSS at this time. No s/s of apparent distress noted. Patient currently alert to self and time, but not to situation. No pain/discomfort noted. Medicated per home scheduled medications, tolerated PO intake well. Will continue to monitor.
Received report from FER Tatum. Pt moved to CDU for observation.

## 2020-07-06 NOTE — ED CDU PROVIDER DISPOSITION NOTE - CLINICAL COURSE
89 y/o M who had a fall earlier today. Pt states that he tripped over a rug and that he could not get up. Patient was found by son several hours later. Patient endorses chronic bilateral hip and thigh pain. Pt placed in observation for PT/SW evaluation. Pt found to have AMS with hyponatremia and rhabdomyolysis. Will be admitted for further workup.

## 2020-07-06 NOTE — H&P ADULT - NSICDXPASTMEDICALHX_GEN_ALL_CORE_FT
PAST MEDICAL HISTORY:  Basal cell cancer     Deep vein thrombosis (DVT) - previously on blood thihners however taken off when had surgerys then never restarted    Hypertension     Prostate ca

## 2020-07-06 NOTE — ED CDU PROVIDER INITIAL DAY NOTE - OBJECTIVE STATEMENT
Pt is a 89yo M who had a fall earlier today. Pt states that he tripped over a rug and that he could not get up. Patient was found by son several hours later. Patient endorses chronic bilateral hip and thigh pain for which he sees many doctors. Patient endorses RUE pain. Denies palpitations or syncope. Denies CP, SOB, headache, confusion, nausea, vomiting, weakness.

## 2020-07-06 NOTE — PROVIDER CONTACT NOTE (OTHER) - BACKGROUND
Background and social hx unclear. Pt lethargic, arouses to voice but confused to situation and unable to recall anything prior.

## 2020-07-06 NOTE — ED ADULT NURSE REASSESSMENT NOTE - COMFORT CARE
repositioned/side rails up/po fluids offered/plan of care explained
assisted to bathroom/plan of care explained/wait time explained/meal provided/po fluids offered
meal provided/wait time explained/assisted to bathroom/po fluids offered/plan of care explained
meal provided/assisted with bathroom and hygiene needs/wait time explained/plan of care explained/po fluids offered

## 2020-07-06 NOTE — H&P ADULT - ASSESSMENT
89 year old male with pmh of basal cell cancer, and prostate cancer coming to hospital after being brought in by son post fall. per patient states fell over one step and remembers event but now doing ok denies loc. spoke to son states unsure how long father was on floor. states father has been having constipation and hip pain for past few weeks. states has been having some memory issues as well but has not followed up with neuro yet. no cp nausea vomiting or diarrhea.    #s/p fall - mechanical  - admit to medicine   - found to have rhabdomyolysis w/ hyponatremia- present on admission  - monitor viral  - images as above ; hip xray pending  - patient w/ hip pain being worked up outpatient by Dr Salvador was to have MRI on Wednesday   - pt consult   - ivf  - nephro consult pending     #hyponatremia  - probable 2/2 dehydration w/ rhabdo  - ivf  - monitor   - check urine na and osm; check serum osm    #dementia?  - follow up outpatient w/ neuro    #HTN- essential   - monitor blood pressure   - losartan    #s/p prostates and basal cell cancer  - follow up w/ heme onc outpatient     #dvt prophylaxis  - lovenox SC   IMPROVE VTE Individual Risk Assessment  RISK                                                                Points  [  ] Previous VTE                                                  3  [  ] Thrombophilia                                               2  [  ] Lower limb paralysis                                      2        (unable to hold up >15 seconds)    [  ] Current Cancer                                              2         (within 6 months)  [  ] Immobilization > 24 hrs                                1  [  ] ICU/CCU stay > 24 hours                              1  [x  ] Age > 60                                                      1    IMPROVE VTE Score ____1_____  IMPROVE Score 0-1: Low Risk, No VTE prophylaxis required for most patients, encourage ambulation.   IMPROVE Score 2-3: At risk, pharmacologic VTE prophylaxis is indicated for most patients (in the absence of a contraindication)  IMPROVE Score > or = 4: High Risk, pharmacologic VTE prophylaxis is indicated for most patients (in the absence of a contraindication)

## 2020-07-06 NOTE — PHYSICAL THERAPY INITIAL EVALUATION ADULT - ADDITIONAL COMMENTS
Pt lethargic, answer most questions incorrectly. Pt unable to give social history. Pt believes he is in the doctors office, and cannot recall his own name at the time of eval. Pt arouses to voice and touch.

## 2020-07-06 NOTE — PROVIDER CONTACT NOTE (OTHER) - ACTION/TREATMENT ORDERED:
Pt placed on program, 2-3xwk, 2 week goals. Min assist bed mobs, transfers with RW, min assist 50ft with RW.

## 2020-07-06 NOTE — ED CDU PROVIDER INITIAL DAY NOTE - MEDICAL DECISION MAKING DETAILS
Patient to be kept on observation for PT evaluation in the morning. States he ambulates with cane or walker at baseline but unable to stand with assistance.

## 2020-07-06 NOTE — PROVIDER CONTACT NOTE (OTHER) - SITUATION
Pt attempted to be seen for PT eval, arouses to voice, following commands but confused in response to questions. Pt will with active bed rest order, RN aware. PT to f/u when appropriate.
Pt presents to [facility] s/p fall, found by son.

## 2020-07-06 NOTE — ED CDU PROVIDER INITIAL DAY NOTE - ATTENDING CONTRIBUTION TO CARE
I personally saw the patient with the resident, and completed the key components of the history and physical exam. I then discussed the management plan with the resident.   gen in nad resp clear cardia cnomurmur abd soft nt neuro no latarazing deficitgs  now with trouble ambulating concern lives by self will obs for pt sw evaluation agree with resdient plan of care

## 2020-07-06 NOTE — H&P ADULT - HISTORY OF PRESENT ILLNESS
History obtained from patient son [phone] History obtained from patient son [phone]    89 year old male with pmh of basal cell cancer, and prostate cancer coming to hospital after being brought in by son post fall. per patient states fell over one step and remembers event but now doing ok denies loc. spoke to son states unsure how long father was on floor. states father has been having constipation and hip pain for past few weeks. states has been having some memory issues as well but has not followed up with neuro yet. no cp nausea vomiting or diarrhea.

## 2020-07-07 LAB
ANION GAP SERPL CALC-SCNC: 13 MMOL/L — SIGNIFICANT CHANGE UP (ref 5–17)
BASOPHILS # BLD AUTO: 0.05 K/UL — SIGNIFICANT CHANGE UP (ref 0–0.2)
BASOPHILS NFR BLD AUTO: 0.6 % — SIGNIFICANT CHANGE UP (ref 0–2)
BUN SERPL-MCNC: 31 MG/DL — HIGH (ref 8–20)
CALCIUM SERPL-MCNC: 8.6 MG/DL — SIGNIFICANT CHANGE UP (ref 8.6–10.2)
CHLORIDE SERPL-SCNC: 95 MMOL/L — LOW (ref 98–107)
CK MB CFR SERPL CALC: 25 NG/ML — HIGH (ref 0–6.7)
CK SERPL-CCNC: 1225 U/L — HIGH (ref 30–200)
CO2 SERPL-SCNC: 23 MMOL/L — SIGNIFICANT CHANGE UP (ref 22–29)
CREAT SERPL-MCNC: 1.02 MG/DL — SIGNIFICANT CHANGE UP (ref 0.5–1.3)
CULTURE RESULTS: NO GROWTH — SIGNIFICANT CHANGE UP
EOSINOPHIL # BLD AUTO: 1.5 K/UL — HIGH (ref 0–0.5)
EOSINOPHIL NFR BLD AUTO: 17.4 % — HIGH (ref 0–6)
GLUCOSE SERPL-MCNC: 97 MG/DL — SIGNIFICANT CHANGE UP (ref 70–99)
HCT VFR BLD CALC: 30.6 % — LOW (ref 39–50)
HGB BLD-MCNC: 10.2 G/DL — LOW (ref 13–17)
IMM GRANULOCYTES NFR BLD AUTO: 0.2 % — SIGNIFICANT CHANGE UP (ref 0–1.5)
LYMPHOCYTES # BLD AUTO: 0.98 K/UL — LOW (ref 1–3.3)
LYMPHOCYTES # BLD AUTO: 11.3 % — LOW (ref 13–44)
MCHC RBC-ENTMCNC: 31.9 PG — SIGNIFICANT CHANGE UP (ref 27–34)
MCHC RBC-ENTMCNC: 33.3 GM/DL — SIGNIFICANT CHANGE UP (ref 32–36)
MCV RBC AUTO: 95.6 FL — SIGNIFICANT CHANGE UP (ref 80–100)
MONOCYTES # BLD AUTO: 0.55 K/UL — SIGNIFICANT CHANGE UP (ref 0–0.9)
MONOCYTES NFR BLD AUTO: 6.4 % — SIGNIFICANT CHANGE UP (ref 2–14)
NEUTROPHILS # BLD AUTO: 5.54 K/UL — SIGNIFICANT CHANGE UP (ref 1.8–7.4)
NEUTROPHILS NFR BLD AUTO: 64.1 % — SIGNIFICANT CHANGE UP (ref 43–77)
PLATELET # BLD AUTO: 251 K/UL — SIGNIFICANT CHANGE UP (ref 150–400)
POTASSIUM SERPL-MCNC: 4.6 MMOL/L — SIGNIFICANT CHANGE UP (ref 3.5–5.3)
POTASSIUM SERPL-SCNC: 4.6 MMOL/L — SIGNIFICANT CHANGE UP (ref 3.5–5.3)
RBC # BLD: 3.2 M/UL — LOW (ref 4.2–5.8)
RBC # FLD: 12.4 % — SIGNIFICANT CHANGE UP (ref 10.3–14.5)
SARS-COV-2 IGG SERPL QL IA: NEGATIVE — SIGNIFICANT CHANGE UP
SARS-COV-2 IGM SERPL IA-ACNC: <0.1 INDEX — SIGNIFICANT CHANGE UP
SARS-COV-2 RNA SPEC QL NAA+PROBE: SIGNIFICANT CHANGE UP
SODIUM SERPL-SCNC: 131 MMOL/L — LOW (ref 135–145)
SPECIMEN SOURCE: SIGNIFICANT CHANGE UP
WBC # BLD: 8.64 K/UL — SIGNIFICANT CHANGE UP (ref 3.8–10.5)
WBC # FLD AUTO: 8.64 K/UL — SIGNIFICANT CHANGE UP (ref 3.8–10.5)

## 2020-07-07 PROCEDURE — 99233 SBSQ HOSP IP/OBS HIGH 50: CPT

## 2020-07-07 RX ORDER — HYDRALAZINE HCL 50 MG
10 TABLET ORAL ONCE
Refills: 0 | Status: COMPLETED | OUTPATIENT
Start: 2020-07-07 | End: 2020-07-07

## 2020-07-07 RX ORDER — AMLODIPINE BESYLATE 2.5 MG/1
2.5 TABLET ORAL DAILY
Refills: 0 | Status: DISCONTINUED | OUTPATIENT
Start: 2020-07-07 | End: 2020-07-07

## 2020-07-07 RX ORDER — LOSARTAN POTASSIUM 100 MG/1
50 TABLET, FILM COATED ORAL DAILY
Refills: 0 | Status: DISCONTINUED | OUTPATIENT
Start: 2020-07-07 | End: 2020-07-10

## 2020-07-07 RX ORDER — AMLODIPINE BESYLATE 2.5 MG/1
2.5 TABLET ORAL DAILY
Refills: 0 | Status: DISCONTINUED | OUTPATIENT
Start: 2020-07-07 | End: 2020-07-10

## 2020-07-07 RX ORDER — SODIUM CHLORIDE 9 MG/ML
1000 INJECTION INTRAMUSCULAR; INTRAVENOUS; SUBCUTANEOUS
Refills: 0 | Status: DISCONTINUED | OUTPATIENT
Start: 2020-07-07 | End: 2020-07-08

## 2020-07-07 RX ADMIN — LOSARTAN POTASSIUM 50 MILLIGRAM(S): 100 TABLET, FILM COATED ORAL at 05:14

## 2020-07-07 RX ADMIN — AMLODIPINE BESYLATE 2.5 MILLIGRAM(S): 2.5 TABLET ORAL at 18:09

## 2020-07-07 RX ADMIN — Medication 1 TABLET(S): at 10:32

## 2020-07-07 RX ADMIN — Medication 81 MILLIGRAM(S): at 10:32

## 2020-07-07 RX ADMIN — POLYETHYLENE GLYCOL 3350 17 GRAM(S): 17 POWDER, FOR SOLUTION ORAL at 10:32

## 2020-07-07 RX ADMIN — SODIUM CHLORIDE 125 MILLILITER(S): 9 INJECTION INTRAMUSCULAR; INTRAVENOUS; SUBCUTANEOUS at 22:48

## 2020-07-07 RX ADMIN — Medication 10 MILLIGRAM(S): at 12:38

## 2020-07-07 RX ADMIN — SODIUM CHLORIDE 125 MILLILITER(S): 9 INJECTION INTRAMUSCULAR; INTRAVENOUS; SUBCUTANEOUS at 11:48

## 2020-07-07 RX ADMIN — ENOXAPARIN SODIUM 40 MILLIGRAM(S): 100 INJECTION SUBCUTANEOUS at 10:31

## 2020-07-07 NOTE — PATIENT PROFILE ADULT - DISASTER - LONGEST PERIOD TOBACCO-FREE
Had therapy. Resting in recliner. Medicated for pain with dilaudid IV. R shoulder bandage dry and intact. 40 years

## 2020-07-07 NOTE — PROGRESS NOTE ADULT - ASSESSMENT
89 year old male with pmh of basal cell cancer, and prostate cancer coming to hospital after being brought in by son s/p fall. Patient states he fell over one step and remembers event, denies LOC. Pt's son states he is unsure how long father was on floor. States father has been having constipation and hip pain for past few weeks. States has been having some memory issues as well but has not followed up with neuro yet.     1) s/p fall - mechanical  - Xrays negative for acute fracture/dislocation  - Hip pain being worked up outpatient by Dr Salvador, was to have MRI on Wednesday   - PT eval pending    2) Rhabdomyolysis w/ hyponatremia- present on admission  - C/w IVF  - CK improving  - Monitor    3) Hyponatremia  - Likely secondary to dehydration w/ rhabdo  - C/w IVF  - Na improving  - Nephro consult pending   - Urine Na and osm and serum osm all WNL  - Monitor BMP    4) Dementia?  - To f/u outpatient w/ neuro    5) HTN - essential   - Noted w/ elevated BPs  - Losartan 50 mg  - Norvasc 2.5 mg added  - Monitor BP    6) s/p prostate and basal cell cancer  - Follow up w/ heme onc outpatient     7) Constipation  - Pt w/ BM this AM  - Continue with current bowel regimen    #DVT prophylaxis: Lovenox SC     DISPO: Nephrology consult. Likely DC 24-48 hrs. 89 year old male with pmh of basal cell cancer, and prostate cancer coming to hospital after being brought in by son s/p fall. Patient states he fell over one step and remembers event, denies LOC. Pt's son states he is unsure how long father was on floor. States father has been having constipation and hip pain for past few weeks. States has been having some memory issues as well but has not followed up with neuro yet.     1) s/p fall - mechanical  - Xrays negative for acute fracture/dislocation  - Hip pain being worked up outpatient by Dr Salvador, was to have MRI on Wednesday   - PT eval pending    2) Rhabdomyolysis w/ hyponatremia- present on admission  - C/w IVF  - CK improving  - Monitor    3) Hyponatremia   - Likely secondary to dehydration w/ rhabdo  - C/w IVF  - Na improving  - Nephro consult pending   - Urine Na and osm and serum osm all WNL  - Monitor BMP    4) Dementia?  - To f/u outpatient w/ neuro    5) HTN - essential   - Noted w/ elevated BPs  - Losartan 50 mg  - Norvasc 2.5 mg added  - Monitor BP    6) s/p prostate and basal cell cancer  - Follow up w/ heme onc outpatient     7) Constipation  - Pt w/ BM this AM  - Continue with current bowel regimen    #DVT prophylaxis: Lovenox SC     DISPO: Nephrology consult. Likely DC 24-48 hrs. 89 year old male with pmh of basal cell cancer, and prostate cancer coming to hospital after being brought in by son s/p fall. Patient states he fell over one step and remembers event, denies LOC. Pt's son states he is unsure how long father was on floor. States father has been having constipation and hip pain for past few weeks. States has been having some memory issues as well but has not followed up with neuro yet.     1) s/p fall - mechanical  - Xrays negative for acute fracture/dislocation  - Hip pain being worked up outpatient by Dr Salvador, was to have MRI on Wednesday     2) Rhabdomyolysis w/ hyponatremia- present on admission  - C/w IVF  - CK improving  - Monitor    3) Hyponatremia   - Likely secondary to dehydration w/ rhabdo  - C/w IVF  - Na improving  - Nephro consult pending   - Urine Na and osm and serum osm all WNL  - Monitor BMP    4) Dementia?  - To f/u outpatient w/ neuro    5) HTN - essential   - Noted w/ elevated BPs  - Losartan 50 mg  - Norvasc 2.5 mg added  - Monitor BP    6) s/p prostate and basal cell cancer  - Follow up w/ heme onc outpatient     7) Constipation  - Pt w/ BM this AM  - Continue with current bowel regimen    #DVT prophylaxis: Lovenox SC     DISPO: Nephrology consult. Likely DC 24-48 hrs.

## 2020-07-07 NOTE — PROGRESS NOTE ADULT - ATTENDING COMMENTS
I have personally seen and examined patient on the above date.  I discussed the case with JESUS Phelps and I agree with findings and plan as detailed per note above, which I have amended where appropriate.      nephro consult noted- fluid restriction started     case d/w patient son Donn [phone]-> hospital course to date reviewed all questions answered

## 2020-07-07 NOTE — CONSULT NOTE ADULT - ASSESSMENT
Hyponatremia:   Pt responding to IV hydration so has a component of hypovolemia  - oral fluid restrict  - avoid excessive hypotonic fluids  - monitor labs

## 2020-07-07 NOTE — CONSULT NOTE ADULT - SUBJECTIVE AND OBJECTIVE BOX
HPI: 89 year PMH + prostate cancer +HTN who was brought to the ED by his son for falls and progressive mental decline.  He was found to have hyponatremia for which we are called to evaluate. Pt cannot give any further details or insights to his medical history or presentation.      PAST MEDICAL & SURGICAL HISTORY:  Deep vein thrombosis (DVT): - previously on blood thihners however taken off when had surgerys then never restarted  Hypertension  Basal cell cancer  Prostate ca  H/O: glaucoma  Post corneal transplant  S/P cataract extraction: surgical correction for glaucoma  S/P prostatectomy: excision basal cell L neck      FAMILY HISTORY:  FH: lung cancer      Social History:  No tobacco; no etoh nor     MEDICATIONS  (STANDING):  aspirin enteric coated 81 milliGRAM(s) Oral daily  enoxaparin Injectable 40 milliGRAM(s) SubCutaneous daily  losartan 50 milliGRAM(s) Oral daily  multivitamin 1 Tablet(s) Oral daily  polyethylene glycol 3350 17 Gram(s) Oral daily  sodium chloride 0.9%. 1000 milliLiter(s) (125 mL/Hr) IV Continuous <Continuous>    MEDICATIONS  (PRN):  senna 2 Tablet(s) Oral at bedtime PRN Constipation      Allergies    IV Iodine dye (Hypotension)  No Known Drug Allergies    Intolerances        REVIEW OF SYSTEMS:    CONSTITUTIONAL: No fever, weight loss, + fatigue  EYES: No eye pain, visual disturbances, or discharge  ENMT:  No difficulty hearing, tinnitus, vertigo; No sinus or throat pain  NECK: No pain or stiffness  RESPIRATORY: No cough, wheezing, chills or hemoptysis; No shortness of breath  CARDIOVASCULAR: No chest pain, palpitations, dizziness, or leg swelling  GASTROINTESTINAL: No abdominal or epigastric pain. No nausea, vomiting, or hematemesis; No diarrhea or constipation. No melena or hematochezia.  GENITOURINARY: No dysuria, frequency, hematuria, or incontinence  NEUROLOGICAL: No headaches, memory loss, loss of strength, numbness, or tremors  SKIN: No itching, burning, rashes, or lesions   MUSCULOSKELETAL: No joint pain or swelling; No muscle, back, or extremity pain        Vital Signs Last 24 Hrs  T(C): 36.6 (2020 11:20), Max: 37.1 (2020 22:23)  T(F): 97.8 (2020 11:20), Max: 98.7 (2020 22:23)  HR: 88 (2020 11:20) (83 - 95)  BP: 182/83 (2020 11:20) (166/75 - 182/83)  BP(mean): --  RR: 20 (2020 11:20) (18 - 20)  SpO2: 96% (2020 11:20) (96% - 100%)    PHYSICAL EXAM:    GENERAL: Elderly, frail  HEAD:  Atraumatic, Normocephalic  EYES: EOMI, PERRLA, conjunctiva and sclera clear  ENMT: Dry mucous membranes  NECK: Supple, No JVD  NERVOUS SYSTEM:  Awake and alert, non focal  CHEST/LUNG: Clear bilaterally  HEART: Regular rate and rhythm; No rub  ABDOMEN: Soft, Nontender, Nondistended; BS+  EXTREMITIES:  2+ Peripheral Pulses, No LE edema  SKIN: No rashes or lesions      LABS:                        10.2   8.64  )-----------( 251      ( 2020 07:42 )             30.6     07-07    131<L>  |  95<L>  |  31.0<H>  ----------------------------<  97  4.6   |  23.0  |  1.02    Sodium, Serum: 128 mmol/L (20 @ 23:25)  Creatine Kinase, Serum: 1225 U/L (20 @ 07:42)    Osmolality, Random Urine: 587 mosm/kg (20 @ 20:33)  Sodium, Random Urine: 39: Reference Ranges have NOT been established for random urine analytes due  to variability in fluid intake and concentration. mmol/L (20 @ 20:33)          Ca    8.6      2020 07:42        Urinalysis Basic - ( 2020 12:21 )    Color: Yellow / Appearance: Clear / S.020 / pH: x  Gluc: x / Ketone: Small  / Bili: Negative / Urobili: Negative mg/dL   Blood: x / Protein: 100 mg/dL / Nitrite: Negative   Leuk Esterase: Negative / RBC: 0-2 /HPF / WBC 0-2   Sq Epi: x / Non Sq Epi: Occasional / Bacteria: Occasional          RADIOLOGY & ADDITIONAL TESTS:  < from: Xray Chest 2 Views PA/Lat (20 @ 12:20) >   EXAM:  XR CHEST PA LAT 2V                          PROCEDURE DATE:  2020          INTERPRETATION:  PA and lateral chest on 2020 12:21 PM. Patient had a fall. Covid virus testing is pending. There is history of CHF. There is history of prostate cancer and hypertension.    Heart is magnified by technique.    The lung fields and pleural surfaces are unremarkable.    No gross fractures are seen.    Chest is similar to 2018.    IMPRESSION: No acute finding or change.    < end of copied text >      < from: CT Head No Cont (20 @ 23:16) >     EXAM:  CT BRAIN                         EXAM:  CT CERVICAL SPINE                          PROCEDURE DATE:  2020          INTERPRETATION:  EXAMINATION: CT CERVICAL SPINE, CT HEAD    DATE: 2020 11:16 PM    INDICATION: Trauma, fall    COMPARISON: None available.    TECHNIQUE: Thin section noncontrast axial images were obtained through the head. CT dose lowering techniques were used, to include: automated exposure control, adjustment for patient size, and or use of iterative reconstruction.    FINDINGS:    CT HEAD:    Mild to moderate atrophy and chronic microvascular ischemic change. No edema or evidence of acute, territorial infarct. No acute intracranial hemorrhage. No midline shift or hydrocephalus.    Extensive paranasal sinus mucosal thickening with air-fluid levels in the bilateral maxillary sinuses. Small scalp contusion with the posterior vertex. Calvarium is intact.    CT CERVICAL SPINE:    No acute fracture. 3-4 mm anterolisthesis at C7-T1. No prevertebral edema.    Extensive degenerative change throughout the cervical spine, most prominent at C5-6 where a combination of disc osteophyte complex and bilateral facet and uncinate hypertrophy results in narrowing of the central canal to 7 mm, and severe left foraminal stenosis. Severe left foraminal stenosis at C2-3 and C3-4. Noncalcified Jalyn along the posterior aspect of the odontoid process, newly completely effaces the anterior epidural space without definitive mass effect on the cervical medullary junction.    Biapical pleural parenchymal scarring in the lung apices. Calcified plaque in the carotid arteries.    IMPRESSION:  1. scalp hematoma without acute intracranial CT abnormality.  2. No cervical spine fracture or traumatic malalignment.    < end of copied text >

## 2020-07-08 LAB
ANION GAP SERPL CALC-SCNC: 13 MMOL/L — SIGNIFICANT CHANGE UP (ref 5–17)
BUN SERPL-MCNC: 21 MG/DL — HIGH (ref 8–20)
CALCIUM SERPL-MCNC: 8.5 MG/DL — LOW (ref 8.6–10.2)
CHLORIDE SERPL-SCNC: 95 MMOL/L — LOW (ref 98–107)
CK MB CFR SERPL CALC: 21.9 NG/ML — HIGH (ref 0–6.7)
CK SERPL-CCNC: 900 U/L — HIGH (ref 30–200)
CO2 SERPL-SCNC: 22 MMOL/L — SIGNIFICANT CHANGE UP (ref 22–29)
CREAT SERPL-MCNC: 0.83 MG/DL — SIGNIFICANT CHANGE UP (ref 0.5–1.3)
GLUCOSE SERPL-MCNC: 102 MG/DL — HIGH (ref 70–99)
HCT VFR BLD CALC: 29.9 % — LOW (ref 39–50)
HGB BLD-MCNC: 10 G/DL — LOW (ref 13–17)
MCHC RBC-ENTMCNC: 31.3 PG — SIGNIFICANT CHANGE UP (ref 27–34)
MCHC RBC-ENTMCNC: 33.4 GM/DL — SIGNIFICANT CHANGE UP (ref 32–36)
MCV RBC AUTO: 93.4 FL — SIGNIFICANT CHANGE UP (ref 80–100)
PLATELET # BLD AUTO: 237 K/UL — SIGNIFICANT CHANGE UP (ref 150–400)
POTASSIUM SERPL-MCNC: 4.4 MMOL/L — SIGNIFICANT CHANGE UP (ref 3.5–5.3)
POTASSIUM SERPL-SCNC: 4.4 MMOL/L — SIGNIFICANT CHANGE UP (ref 3.5–5.3)
RBC # BLD: 3.2 M/UL — LOW (ref 4.2–5.8)
RBC # FLD: 12.2 % — SIGNIFICANT CHANGE UP (ref 10.3–14.5)
SODIUM SERPL-SCNC: 130 MMOL/L — LOW (ref 135–145)
URATE SERPL-MCNC: 4.1 MG/DL — SIGNIFICANT CHANGE UP (ref 3.4–7)
WBC # BLD: 8.39 K/UL — SIGNIFICANT CHANGE UP (ref 3.8–10.5)
WBC # FLD AUTO: 8.39 K/UL — SIGNIFICANT CHANGE UP (ref 3.8–10.5)

## 2020-07-08 PROCEDURE — 99233 SBSQ HOSP IP/OBS HIGH 50: CPT

## 2020-07-08 RX ORDER — SODIUM CHLORIDE 9 MG/ML
1000 INJECTION INTRAMUSCULAR; INTRAVENOUS; SUBCUTANEOUS
Refills: 0 | Status: DISCONTINUED | OUTPATIENT
Start: 2020-07-08 | End: 2020-07-10

## 2020-07-08 RX ADMIN — Medication 81 MILLIGRAM(S): at 12:22

## 2020-07-08 RX ADMIN — SODIUM CHLORIDE 150 MILLILITER(S): 9 INJECTION INTRAMUSCULAR; INTRAVENOUS; SUBCUTANEOUS at 18:46

## 2020-07-08 NOTE — SWALLOW BEDSIDE ASSESSMENT ADULT - SWALLOW EVAL: DIAGNOSIS
Moderate ora dysphagia confounded by reduced cognition/confusion at this time. Cannot r/o pharyngeal dysphagia with thin liquids.

## 2020-07-08 NOTE — SWALLOW BEDSIDE ASSESSMENT ADULT - ASR SWALLOW ASPIRATION MONITOR
cough/fever/change of breathing pattern/oral hygiene/throat clearing/position upright (90Y)/pneumonia/upper respiratory infection/gurgly voice

## 2020-07-08 NOTE — SWALLOW BEDSIDE ASSESSMENT ADULT - SLP PERTINENT HISTORY OF CURRENT PROBLEM
89 year old male with pmh of basal cell cancer, and prostate cancer coming to hospital after being brought in by son s/p fall. Patient states he fell over one step and remembers event, denies LOC. Pt's son states he is unsure how long father was on floor. States father has been having constipation and hip pain for past few weeks. States has been having some memory issues as well but has not followed up with neuro yet.

## 2020-07-08 NOTE — SWALLOW BEDSIDE ASSESSMENT ADULT - ORAL PHASE
Within functional limits mildly reduced atteniton to bolus cleared with sip of liquid, mildly reduced attention to bolus/Lingual stasis

## 2020-07-08 NOTE — SWALLOW BEDSIDE ASSESSMENT ADULT - COMMENTS
Additional solid trials not adminstered at this time Overall swallow function - impacted by reduced cognition/confusion.  Pt unable to hold cup/spoon himself, bringing a fisted hand to his mouth and trying to "drink."

## 2020-07-08 NOTE — PROGRESS NOTE ADULT - ASSESSMENT
89 year old male with pmh of basal cell cancer, and prostate cancer coming to hospital after being brought in by son s/p fall. Patient states he fell over one step and remembers event, denies LOC. Pt's son states he is unsure how long father was on floor. States father has been having constipation and hip pain for past few weeks. States has been having some memory issues as well but has not followed up with neuro yet.     #s/p fall - mechanical  - Xrays negative for acute fracture/dislocation  - Hip pain being worked up outpatient by Dr Salvador, was to have MRI on Wednesday   - PT eval pending    #Rhabdomyolysis w/ hyponatremia- present on admission  - C/w IVF  - CK improving  - Monitor    #Hyponatremia   - Likely secondary to dehydration w/ rhabdo  - C/w IVF  - monitor na  - Nephro consult appreciated   - Urine Na and osm and serum osm all WNL  - Monitor BMP  - fluid restriction oral    #Dementia?  - To f/u outpatient w/ neuro    #HTN - essential   - Noted w/ elevated BPs  - Losartan, norvasc  - Monitor BP    #s/p prostate and basal cell cancer  - Follow up w/ heme onc outpatient     #Constipation  - Continue with current bowel regimen    #DVT prophylaxis  - Lovenox SC     case d/w patient son Donn [phone]-> hospital course to date reviewed all questions answered . 89 year old male with pmh of basal cell cancer, and prostate cancer coming to hospital after being brought in by son s/p fall. Patient states he fell over one step and remembers event, denies LOC. Pt's son states he is unsure how long father was on floor. States father has been having constipation and hip pain for past few weeks. States has been having some memory issues as well but has not followed up with neuro yet.     #s/p fall - mechanical  - Xrays negative for acute fracture/dislocation  - Hip pain being worked up outpatient by Dr Salvador, was to have MRI on Wednesday     #Rhabdomyolysis w/ hyponatremia- present on admission  - C/w IVF  - CK improving  - Monitor    #Hyponatremia   - Likely secondary to dehydration w/ rhabdo  - C/w IVF  - monitor na  - Nephro consult appreciated   - Urine Na and osm and serum osm all WNL  - Monitor BMP  - fluid restriction oral    #Dementia?  - To f/u outpatient w/ neuro    #HTN - essential   - Noted w/ elevated BPs  - Losartan, norvasc  - Monitor BP    #s/p prostate and basal cell cancer  - Follow up w/ heme onc outpatient     #Constipation  - Continue with current bowel regimen    #DVT prophylaxis  - Lovenox SC     case d/w patient son Donn [phone]-> hospital course to date reviewed all questions answered .

## 2020-07-08 NOTE — SWALLOW BEDSIDE ASSESSMENT ADULT - SWALLOW EVAL: RECOMMENDED FEEDING/EATING TECHNIQUES
maintain upright posture during/after eating for 30 mins/oral hygiene/position upright (90 degrees)/small sips/bites/no straws/crush medication (when feasible)

## 2020-07-08 NOTE — CDI QUERY NOTE - NSCDIOTHERTXTBX_GEN_ALL_CORE_HH
SUPPORTING DOCUMENTATION / EVIDENCE:  s/p mechanical fall at home, on floor sev hrs.  adm with AMS, hyponatremia and rhabdomyolysis  Na 128      Altered Mental Status is a non-specific term. Can you clarify, in progress notes and dc summary, the likely underlying cause?     Document etiology of the altered mental status such as:     AMS likely secondary to metabolic encephalopathy due to hyponatremia/ dehydration  AMS likely due to _____________  other  not clinically significant      Thank you

## 2020-07-08 NOTE — PROGRESS NOTE ADULT - ASSESSMENT
Hyponatremia: serum Na slowly improving  Pt responding to IV hydration so has a component of hypovolemia  - oral fluid restrict  - avoid excessive hypotonic fluids  - monitor labs    Will follow

## 2020-07-08 NOTE — CHART NOTE - NSCHARTNOTEFT_GEN_A_CORE
called and spoke to patient son advised patient more confused and agitated today - he previously stated that happens to father when in hospital-> advised son started enhanced supervision- son would like to avoid pharmacologic measures for agitation - advised would escalate to 1:1 for safety next and continue to keep him updated on status called by rn for patient with increased agitation and confusion    called and spoke to patient son advised patient more confused and agitated today - he previously stated that happens to father when in hospital-> advised son started enhanced supervision- son would like to avoid pharmacologic measures for agitation - advised would escalate to 1:1 for safety next and continue to keep him updated on status. advised current status probably 2/2 hospital delerium

## 2020-07-08 NOTE — SWALLOW BEDSIDE ASSESSMENT ADULT - SLP GENERAL OBSERVATIONS
Pt received A&A in bed, 0x1, significant confusion/reduced cognition, intermittent following of simple commands, confused expressive language, unable to provide any history, nonverbal pain 0/10 pre/post evaluation

## 2020-07-09 ENCOUNTER — TRANSCRIPTION ENCOUNTER (OUTPATIENT)
Age: 85
End: 2020-07-09

## 2020-07-09 LAB
ANION GAP SERPL CALC-SCNC: 12 MMOL/L — SIGNIFICANT CHANGE UP (ref 5–17)
BUN SERPL-MCNC: 17 MG/DL — SIGNIFICANT CHANGE UP (ref 8–20)
CALCIUM SERPL-MCNC: 8.3 MG/DL — LOW (ref 8.6–10.2)
CHLORIDE SERPL-SCNC: 94 MMOL/L — LOW (ref 98–107)
CK MB CFR SERPL CALC: 13.5 NG/ML — HIGH (ref 0–6.7)
CK SERPL-CCNC: 505 U/L — HIGH (ref 30–200)
CO2 SERPL-SCNC: 24 MMOL/L — SIGNIFICANT CHANGE UP (ref 22–29)
CREAT SERPL-MCNC: 0.73 MG/DL — SIGNIFICANT CHANGE UP (ref 0.5–1.3)
GLUCOSE SERPL-MCNC: 99 MG/DL — SIGNIFICANT CHANGE UP (ref 70–99)
POTASSIUM SERPL-MCNC: 4.1 MMOL/L — SIGNIFICANT CHANGE UP (ref 3.5–5.3)
POTASSIUM SERPL-SCNC: 4.1 MMOL/L — SIGNIFICANT CHANGE UP (ref 3.5–5.3)
SODIUM SERPL-SCNC: 130 MMOL/L — LOW (ref 135–145)

## 2020-07-09 PROCEDURE — 99232 SBSQ HOSP IP/OBS MODERATE 35: CPT

## 2020-07-09 PROCEDURE — 70450 CT HEAD/BRAIN W/O DYE: CPT | Mod: 26

## 2020-07-09 RX ORDER — ERYTHROMYCIN BASE 5 MG/GRAM
1 OINTMENT (GRAM) OPHTHALMIC (EYE)
Refills: 0 | Status: DISCONTINUED | OUTPATIENT
Start: 2020-07-09 | End: 2020-07-10

## 2020-07-09 RX ORDER — AMLODIPINE BESYLATE 2.5 MG/1
1 TABLET ORAL
Qty: 0 | Refills: 0 | DISCHARGE
Start: 2020-07-09

## 2020-07-09 RX ORDER — POLYETHYLENE GLYCOL 3350 17 G/17G
17 POWDER, FOR SOLUTION ORAL
Qty: 0 | Refills: 0 | DISCHARGE
Start: 2020-07-09

## 2020-07-09 RX ORDER — SENNA PLUS 8.6 MG/1
2 TABLET ORAL
Qty: 0 | Refills: 0 | DISCHARGE
Start: 2020-07-09

## 2020-07-09 RX ADMIN — Medication 1 APPLICATION(S): at 21:23

## 2020-07-09 RX ADMIN — SODIUM CHLORIDE 150 MILLILITER(S): 9 INJECTION INTRAMUSCULAR; INTRAVENOUS; SUBCUTANEOUS at 14:11

## 2020-07-09 RX ADMIN — Medication 1 APPLICATION(S): at 17:46

## 2020-07-09 RX ADMIN — Medication 81 MILLIGRAM(S): at 14:11

## 2020-07-09 RX ADMIN — SODIUM CHLORIDE 150 MILLILITER(S): 9 INJECTION INTRAMUSCULAR; INTRAVENOUS; SUBCUTANEOUS at 06:07

## 2020-07-09 NOTE — DISCHARGE NOTE PROVIDER - NSDCMRMEDTOKEN_GEN_ALL_CORE_FT
amLODIPine 2.5 mg oral tablet: 1 tab(s) orally once a day  aspirin 81 mg oral tablet: 1 tab(s) orally once a day  losartan 50 mg oral tablet: 1 tab(s) orally once a day  Multiple Vitamins oral tablet: 1 tab(s) orally once a day  polyethylene glycol 3350 oral powder for reconstitution: 17 gram(s) orally once a day  senna oral tablet: 2 tab(s) orally once a day (at bedtime), As needed, Constipation

## 2020-07-09 NOTE — DISCHARGE NOTE PROVIDER - HOSPITAL COURSE
89 year old male with pmh of basal cell cancer, and prostate cancer coming to hospital s/p fall. patient found to have rhabdomyolysis and hyponatremia while admitted and started on ivf. patient also seen by nephrology. patient now being d/c in stable condition with down trending ck levels        Time spent on patients discharge 32 minutes

## 2020-07-09 NOTE — DISCHARGE NOTE PROVIDER - CARE PROVIDERS DIRECT ADDRESSES
,DirectAddress_Unknown ,DirectAddress_Unknown,[email or web address]

## 2020-07-09 NOTE — CHART NOTE - NSCHARTNOTEFT_GEN_A_CORE
called by rn for patient with increased lethargy however has not slept in past few days ; also called by rn for patient with crusting on eyes    check ct head to r/o acute cva    start erythromycin ointment

## 2020-07-09 NOTE — DISCHARGE NOTE PROVIDER - CARE PROVIDER_API CALL
Bandar Sorto  NEPHROLOGY  [address] [place] [address]  Phone: [phone]  Fax: [phone]  Follow Up Time: Bandar Sroto  NEPHROLOGY  [address] [place] [address]  Phone: [phone]  Fax: [phone]  Follow Up Time:     [place] [address]  Phone: [phone]  Fax: [phone]  Follow Up Time:

## 2020-07-09 NOTE — DISCHARGE NOTE PROVIDER - PROVIDER TOKENS
PROVIDER:[TOKEN:[[id number]]] PROVIDER:[TOKEN:[[id number]]],PROVIDER:[TOKEN:[[id number]]]

## 2020-07-09 NOTE — PROGRESS NOTE ADULT - ASSESSMENT
89 year old male with pmh of basal cell cancer, and prostate cancer coming to hospital after being brought in by son s/p fall. Patient states he fell over one step and remembers event, denies LOC. Pt's son states he is unsure how long father was on floor. States father has been having constipation and hip pain for past few weeks. States has been having some memory issues as well but has not followed up with neuro yet.     #s/p fall - mechanical  - Xrays negative for acute fracture/dislocation  - Hip pain being worked up outpatient by Dr Salvador, was to have MRI on Wednesday   - PT reeval    #Rhabdomyolysis w/ hyponatremia- present on admission  - C/w IVF  - CK improving  - Monitor    #Hyponatremia   - Likely secondary to dehydration w/ rhabdo  - C/w IVF  - monitor na  - Nephro consult appreciated   - Urine Na and osm and serum osm all WNL  - Monitor BMP  - fluid restriction oral    #Dementia?  - To f/u outpatient w/ neuro    #HTN - essential   - Noted w/ elevated BPs  - Losartan, norvasc  - Monitor BP    #s/p prostate and basal cell cancer  - Follow up w/ heme onc outpatient     #Constipation  - Continue with current bowel regimen    #DVT prophylaxis  - Lovenox SC     case d/w patient son Donn [phone]-> hospital course to date reviewed all questions answered; pt reeval pending 89 year old male with pmh of basal cell cancer, and prostate cancer coming to hospital after being brought in by son s/p fall. Patient states he fell over one step and remembers event, denies LOC. Pt's son states he is unsure how long father was on floor. States father has been having constipation and hip pain for past few weeks. States has been having some memory issues as well but has not followed up with neuro yet.     #s/p fall - mechanical- present on admission  - w/ ams likely 2/2 hyponatremia, dehydration and probable dementia present on admission  - Xrays negative for acute fracture/dislocation  - Hip pain being worked up outpatient by Dr Salvador, was to have MRI on Wednesday   - PT reeval    #Rhabdomyolysis w/ hyponatremia- present on admission  - C/w IVF  - CK improving  - Monitor    #Hyponatremia   - Likely secondary to dehydration w/ rhabdo  - C/w IVF  - monitor na  - Nephro consult appreciated   - Urine Na and osm and serum osm all WNL  - Monitor BMP  - fluid restriction oral    #Dementia?  - To f/u outpatient w/ neuro    #HTN - essential   - Noted w/ elevated BPs  - Losartan, norvasc  - Monitor BP    #s/p prostate and basal cell cancer  - Follow up w/ heme onc outpatient     #Constipation  - Continue with current bowel regimen    #DVT prophylaxis  - Lovenox SC     case d/w patient son Donn [phone]-> hospital course to date reviewed all questions answered; pt reeval pending

## 2020-07-09 NOTE — DISCHARGE NOTE PROVIDER - NSDCCPCAREPLAN_GEN_ALL_CORE_FT
PRINCIPAL DISCHARGE DIAGNOSIS  Diagnosis: Rhabdomyolysis  Assessment and Plan of Treatment: - oral hydration   - follow up with nephrology and pmd      SECONDARY DISCHARGE DIAGNOSES  Diagnosis: Basal cell carcinoma (BCC)  Assessment and Plan of Treatment: - follow up with hematology and oncology given hx of basal cell carcinoma and prostate cancer    Diagnosis: Dementia  Assessment and Plan of Treatment: - follow up with neurology for dementia work up    Diagnosis: Hyponatremia  Assessment and Plan of Treatment:

## 2020-07-10 ENCOUNTER — TRANSCRIPTION ENCOUNTER (OUTPATIENT)
Age: 85
End: 2020-07-10

## 2020-07-10 VITALS
HEART RATE: 89 BPM | RESPIRATION RATE: 18 BRPM | SYSTOLIC BLOOD PRESSURE: 156 MMHG | DIASTOLIC BLOOD PRESSURE: 81 MMHG | OXYGEN SATURATION: 96 %

## 2020-07-10 LAB
ANION GAP SERPL CALC-SCNC: 12 MMOL/L — SIGNIFICANT CHANGE UP (ref 5–17)
BUN SERPL-MCNC: 21 MG/DL — HIGH (ref 8–20)
CALCIUM SERPL-MCNC: 8.6 MG/DL — SIGNIFICANT CHANGE UP (ref 8.6–10.2)
CHLORIDE SERPL-SCNC: 98 MMOL/L — SIGNIFICANT CHANGE UP (ref 98–107)
CK MB CFR SERPL CALC: 6.3 NG/ML — SIGNIFICANT CHANGE UP (ref 0–6.7)
CK SERPL-CCNC: 184 U/L — SIGNIFICANT CHANGE UP (ref 30–200)
CO2 SERPL-SCNC: 22 MMOL/L — SIGNIFICANT CHANGE UP (ref 22–29)
CORTICOSTEROID BINDING GLOBULIN RESULT: 1.5 MG/DL — LOW
CORTIS F/TOTAL MFR SERPL: 50 % — SIGNIFICANT CHANGE UP
CORTIS SERPL-MCNC: 20 UG/DL — HIGH
CORTISOL, FREE RESULT: 10 UG/DL — HIGH
CREAT SERPL-MCNC: 0.92 MG/DL — SIGNIFICANT CHANGE UP (ref 0.5–1.3)
GLUCOSE SERPL-MCNC: 94 MG/DL — SIGNIFICANT CHANGE UP (ref 70–99)
HCT VFR BLD CALC: 29.2 % — LOW (ref 39–50)
HGB BLD-MCNC: 9.8 G/DL — LOW (ref 13–17)
MCHC RBC-ENTMCNC: 32 PG — SIGNIFICANT CHANGE UP (ref 27–34)
MCHC RBC-ENTMCNC: 33.6 GM/DL — SIGNIFICANT CHANGE UP (ref 32–36)
MCV RBC AUTO: 95.4 FL — SIGNIFICANT CHANGE UP (ref 80–100)
PLATELET # BLD AUTO: 227 K/UL — SIGNIFICANT CHANGE UP (ref 150–400)
POTASSIUM SERPL-MCNC: 3.8 MMOL/L — SIGNIFICANT CHANGE UP (ref 3.5–5.3)
POTASSIUM SERPL-SCNC: 3.8 MMOL/L — SIGNIFICANT CHANGE UP (ref 3.5–5.3)
RBC # BLD: 3.06 M/UL — LOW (ref 4.2–5.8)
RBC # FLD: 12.3 % — SIGNIFICANT CHANGE UP (ref 10.3–14.5)
SODIUM SERPL-SCNC: 132 MMOL/L — LOW (ref 135–145)
WBC # BLD: 7.44 K/UL — SIGNIFICANT CHANGE UP (ref 3.8–10.5)
WBC # FLD AUTO: 7.44 K/UL — SIGNIFICANT CHANGE UP (ref 3.8–10.5)

## 2020-07-10 PROCEDURE — 87086 URINE CULTURE/COLONY COUNT: CPT

## 2020-07-10 PROCEDURE — 81001 URINALYSIS AUTO W/SCOPE: CPT

## 2020-07-10 PROCEDURE — 82550 ASSAY OF CK (CPK): CPT

## 2020-07-10 PROCEDURE — 36415 COLL VENOUS BLD VENIPUNCTURE: CPT

## 2020-07-10 PROCEDURE — 96374 THER/PROPH/DIAG INJ IV PUSH: CPT

## 2020-07-10 PROCEDURE — 84550 ASSAY OF BLOOD/URIC ACID: CPT

## 2020-07-10 PROCEDURE — 73522 X-RAY EXAM HIPS BI 3-4 VIEWS: CPT

## 2020-07-10 PROCEDURE — 96372 THER/PROPH/DIAG INJ SC/IM: CPT | Mod: XU

## 2020-07-10 PROCEDURE — 97530 THERAPEUTIC ACTIVITIES: CPT

## 2020-07-10 PROCEDURE — 73110 X-RAY EXAM OF WRIST: CPT

## 2020-07-10 PROCEDURE — 99239 HOSP IP/OBS DSCHRG MGMT >30: CPT

## 2020-07-10 PROCEDURE — 84443 ASSAY THYROID STIM HORMONE: CPT

## 2020-07-10 PROCEDURE — 71046 X-RAY EXAM CHEST 2 VIEWS: CPT

## 2020-07-10 PROCEDURE — 73080 X-RAY EXAM OF ELBOW: CPT

## 2020-07-10 PROCEDURE — 85027 COMPLETE CBC AUTOMATED: CPT

## 2020-07-10 PROCEDURE — G0378: CPT

## 2020-07-10 PROCEDURE — 92610 EVALUATE SWALLOWING FUNCTION: CPT

## 2020-07-10 PROCEDURE — 72125 CT NECK SPINE W/O DYE: CPT

## 2020-07-10 PROCEDURE — 86769 SARS-COV-2 COVID-19 ANTIBODY: CPT

## 2020-07-10 PROCEDURE — 84300 ASSAY OF URINE SODIUM: CPT

## 2020-07-10 PROCEDURE — 80048 BASIC METABOLIC PNL TOTAL CA: CPT

## 2020-07-10 PROCEDURE — 97116 GAIT TRAINING THERAPY: CPT

## 2020-07-10 PROCEDURE — 99285 EMERGENCY DEPT VISIT HI MDM: CPT | Mod: 25

## 2020-07-10 PROCEDURE — 82553 CREATINE MB FRACTION: CPT

## 2020-07-10 PROCEDURE — 83935 ASSAY OF URINE OSMOLALITY: CPT

## 2020-07-10 PROCEDURE — U0003: CPT

## 2020-07-10 PROCEDURE — 70450 CT HEAD/BRAIN W/O DYE: CPT

## 2020-07-10 PROCEDURE — 73090 X-RAY EXAM OF FOREARM: CPT

## 2020-07-10 PROCEDURE — 73060 X-RAY EXAM OF HUMERUS: CPT

## 2020-07-10 PROCEDURE — 83930 ASSAY OF BLOOD OSMOLALITY: CPT

## 2020-07-10 RX ADMIN — Medication 1 APPLICATION(S): at 16:49

## 2020-07-10 RX ADMIN — Medication 1 APPLICATION(S): at 05:32

## 2020-07-10 RX ADMIN — SODIUM CHLORIDE 150 MILLILITER(S): 9 INJECTION INTRAMUSCULAR; INTRAVENOUS; SUBCUTANEOUS at 05:31

## 2020-07-10 RX ADMIN — Medication 81 MILLIGRAM(S): at 13:55

## 2020-07-10 RX ADMIN — Medication 1 APPLICATION(S): at 11:17

## 2020-07-10 NOTE — DIETITIAN INITIAL EVALUATION ADULT. - OTHER INFO
89 year old male with pmh of basal cell cancer, and prostate cancer coming to hospital after being brought in by son s/p fall. Patient states he fell over one step and remembers event, denies LOC. Pt's son states he is unsure how long father was on floor. States father has been having constipation and hip pain for past few weeks. States has been having some memory issues as well but has not followed up with neuro yet. Pt A&A Ox1. Pt requires total assistance with meals, consumed 50% of meal per tray observation. No ht documented. Last documented BM 7/8.

## 2020-07-10 NOTE — PROGRESS NOTE ADULT - SUBJECTIVE AND OBJECTIVE BOX
Patient is a 89y old  Male who presents with a chief complaint of carmelo; rhabdomyolysis (09 Jul 2020 11:07)    Patient seen and examined at bedside.     ALLERGIES:  IV Iodine dye (Hypotension)  No Known Drug Allergies    MEDICATIONS  (STANDING):  amLODIPine   Tablet 2.5 milliGRAM(s) Oral daily  aspirin enteric coated 81 milliGRAM(s) Oral daily  enoxaparin Injectable 40 milliGRAM(s) SubCutaneous daily  erythromycin   Ointment 1 Application(s) Both EYES four times a day  losartan 50 milliGRAM(s) Oral daily  multivitamin 1 Tablet(s) Oral daily  polyethylene glycol 3350 17 Gram(s) Oral daily    MEDICATIONS  (PRN):  senna 2 Tablet(s) Oral at bedtime PRN Constipation    Vital Signs Last 24 Hrs  T(F): 98 (10 Jul 2020 08:03), Max: 98.2 (09 Jul 2020 15:52)  HR: 91 (10 Jul 2020 08:03) (79 - 91)  BP: 167/83 (10 Jul 2020 08:03) (111/62 - 167/83)  RR: 18 (10 Jul 2020 08:03) (18 - 18)  SpO2: 96% (10 Jul 2020 08:03) (94% - 96%)  I&O's Summary    09 Jul 2020 07:01  -  10 Jul 2020 07:00  --------------------------------------------------------  IN: 1650 mL / OUT: 0 mL / NET: 1650 mL    PHYSICAL EXAM:  General: NAD, Alert, elderly  ENT: MMM, no thrush  Neck: Supple, No JVD  Lungs: Clear to auscultation bilaterally, good air entry, non-labored breathing  Cardio: S1/S2+; no pitting edema   Abdomen: Soft, Nontender, Nondistended; Bowel sounds present  Extremities: No calf tenderness      LABS:                        9.8    7.44  )-----------( 227      ( 10 Jul 2020 07:16 )             29.2     07-10    132  |  98  |  21.0  ----------------------------<  94  3.8   |  22.0  |  0.92    Ca    8.6      10 Jul 2020 07:16          eGFR if Non African American: 72 mL/min/1.73M2 (07-10-20 @ 07:16)  eGFR if : 83 mL/min/1.73M2 (07-10-20 @ 07:16)    CARDIAC MARKERS ( 10 Jul 2020 07:16 )  x     / x     / 184 U/L / x     / 6.3 ng/mL  CARDIAC MARKERS ( 09 Jul 2020 10:00 )  x     / x     / 505 U/L / x     / 13.5 ng/mL  CARDIAC MARKERS ( 08 Jul 2020 07:04 )  x     / x     / 900 U/L / x     / 21.9 ng/mL    Cultures  Culture - Urine (collected 06 Jul 2020 17:27)  Source: .Urine Clean Catch (Midstream)  Final Report (07 Jul 2020 15:44):    No growth    RADIOLOGY & ADDITIONAL TESTS:  < from: CT Head No Cont (07.09.20 @ 19:16) >  IMPRESSION:  Moderate chronic microvascular changes without evidence of an acute transcortical infarction or hemorrhage. MR is a more sensitive imaging modality for the evaluation of an acute infarction.   < end of copied text >    < from: Xray Hip 3-4 Views, Bilateral (07.06.20 @ 16:08) >  Impression:  No evidence of fracture or dislocation of either hip..  < end of copied text >    < from: Xray Chest 2 Views PA/Lat (07.06.20 @ 12:20) >  IMPRESSION: No acute finding or change.  < end of copied text >    < from: CT head and  Cervical Spine No Cont (07.05.20 @ 23:16) >  IMPRESSION:  1. scalp hematoma without acute intracranial CT abnormality.  2. No cervical spine fracture or traumatic malalignment.  < end of copied text >    < from: Xray Humerus, wrist, forearm and Elbow AP + Lateral + Oblique, Right (07.05.20 @ 23:14) >  IMPRESSION: Multijoint degeneration. No fracture.  < end of copied text >
CC: GRISELDA, Rhabdomyolysis    INTERVAL HPI/OVERNIGHT EVENTS:  Patient seen and examined at bedside.  No overnight events reported by staff.  Patient without new complaints at this time.   Patient denies chills, dizziness, headache, CP, SOB, abdominal pain, N/V.    PHYSICAL EXAM:  Vital Signs Last 24 Hrs  T(C): 36.6 (2020 11:20), Max: 37.1 (2020 22:23)  T(F): 97.8 (2020 11:20), Max: 98.7 (2020 22:23)  HR: 88 (2020 11:20) (83 - 95)  BP: 182/83 (2020 11:20) (166/75 - 182/83)  BP(mean): --  RR: 20 (2020 11:20) (18 - 20)  SpO2: 96% (2020 11:20) (96% - 100%)    GENERAL: NAD, lying comfortably  HEAD: Atraumatic, Normocephalic  EYES: EOMI, PERRLA, conjunctiva and sclera clear  ENMT: Moist mucous membranes  NECK: Supple, No JVD  NERVOUS SYSTEM: A&OX3  CHEST/LUNG: Clear to auscultation b/l; Unlabored respirations  HEART: S1S2+, Regular rate and rhythm  ABDOMEN: Soft, Nontender, Nondistended; BS+  EXTREMITIES:  2+ Peripheral Pulses, No LE edema  SKIN: Warm and dry    LABS:                        10.2   8.64  )-----------( 251      ( 2020 07:42 )             30.6     07-07    131<L>  |  95<L>  |  31.0<H>  ----------------------------<  97  4.6   |  23.0  |  1.02    Ca    8.6      2020 07:42      Urinalysis Basic - ( 2020 12:21 )  Color: Yellow / Appearance: Clear / S.020 / pH: x  Gluc: x / Ketone: Small  / Bili: Negative / Urobili: Negative mg/dL   Blood: x / Protein: 100 mg/dL / Nitrite: Negative   Leuk Esterase: Negative / RBC: 0-2 /HPF / WBC 0-2   Sq Epi: x / Non Sq Epi: Occasional / Bacteria: Occasional      < from: Xray Hip 3-4 Views, Bilateral (20 @ 16:08) >  Impression:  No evidence of fracture or dislocation of either hip..    NITA MITCHELL M.D., ATTENDING RADIOLOGIST  This document has been electronically signed. 2020  4:32PM    < end of copied text >        < from: Xray Elbow AP + Lateral + Oblique, Right (20 @ 23:14) >  EXAM:  HUMERUS-RIGHT                         EXAM:  WRIST-RIGHT                         EXAM:  ELBOW-RIGHT                         EXAM:  FOREARM-ULNA & RADIUS-RIGHT                          PROCEDURE DATE:  2020      IMPRESSION: Multijoint degeneration. No fracture.    CAMILLE SCHNEIDER M.D., ATTENDING RADIOLOGIST  This document has been electronically signed. 2020  9:28AM    < end of copied text >      MEDICATIONS  (STANDING):  amLODIPine   Tablet 2.5 milliGRAM(s) Oral daily  aspirin enteric coated 81 milliGRAM(s) Oral daily  enoxaparin Injectable 40 milliGRAM(s) SubCutaneous daily  hydrALAZINE Injectable 10 milliGRAM(s) IV Push once  losartan 50 milliGRAM(s) Oral daily  multivitamin 1 Tablet(s) Oral daily  polyethylene glycol 3350 17 Gram(s) Oral daily  sodium chloride 0.9%. 1000 milliLiter(s) (125 mL/Hr) IV Continuous <Continuous>    MEDICATIONS  (PRN):  senna 2 Tablet(s) Oral at bedtime PRN Constipation
NEPHROLOGY INTERVAL HPI/OVERNIGHT EVENTS:    Examined   No distress    MEDICATIONS  (STANDING):  amLODIPine   Tablet 2.5 milliGRAM(s) Oral daily  aspirin enteric coated 81 milliGRAM(s) Oral daily  enoxaparin Injectable 40 milliGRAM(s) SubCutaneous daily  losartan 50 milliGRAM(s) Oral daily  multivitamin 1 Tablet(s) Oral daily  polyethylene glycol 3350 17 Gram(s) Oral daily  sodium chloride 0.9%. 1000 milliLiter(s) (125 mL/Hr) IV Continuous <Continuous>    MEDICATIONS  (PRN):  senna 2 Tablet(s) Oral at bedtime PRN Constipation      Allergies    IV Iodine dye (Hypotension)  No Known Drug Allergies    Intolerances        Vital Signs Last 24 Hrs  T(C): 36.7 (08 Jul 2020 08:11), Max: 36.8 (07 Jul 2020 22:50)  T(F): 98 (08 Jul 2020 08:11), Max: 98.3 (07 Jul 2020 22:50)  HR: 97 (08 Jul 2020 08:11) (97 - 107)  BP: 150/82 (08 Jul 2020 06:16) (150/82 - 175/81)  BP(mean): --  RR: 18 (08 Jul 2020 08:11) (18 - 20)  SpO2: 96% (08 Jul 2020 08:11) (96% - 99%)  Daily     Daily     PHYSICAL EXAM:  GENERAL: Elderly, frail  HEAD:  Atraumatic, Normocephalic  EYES: EOMI  NECK: Supple, No JVD  NERVOUS SYSTEM:  Awake and alert  CHEST/LUNG: Clear bilaterally  HEART: Regular rate and rhythm; No rub  ABDOMEN: Soft, Nontender, Nondistended; BS+  EXTREMITIES:  No LE edema  LABS:                        10.0   8.39  )-----------( 237      ( 08 Jul 2020 07:04 )             29.9     07-08    130<L>  |  95<L>  |  21.0<H>  ----------------------------<  102<H>  4.4   |  22.0  |  0.83    Ca    8.5<L>      08 Jul 2020 07:04                  RADIOLOGY & ADDITIONAL TESTS:
NEPHROLOGY INTERVAL HPI/OVERNIGHT EVENTS:  pt comfortable when seen earlier  no acute distress noted    MEDICATIONS  (STANDING):  amLODIPine   Tablet 2.5 milliGRAM(s) Oral daily  aspirin enteric coated 81 milliGRAM(s) Oral daily  enoxaparin Injectable 40 milliGRAM(s) SubCutaneous daily  erythromycin   Ointment 1 Application(s) Both EYES four times a day  losartan 50 milliGRAM(s) Oral daily  multivitamin 1 Tablet(s) Oral daily  polyethylene glycol 3350 17 Gram(s) Oral daily    MEDICATIONS  (PRN):  senna 2 Tablet(s) Oral at bedtime PRN Constipation      Allergies    IV Iodine dye (Hypotension)  No Known Drug Allergies          Vital Signs Last 24 Hrs  T(C): 36.7 (10 Jul 2020 08:03), Max: 36.8 (09 Jul 2020 15:52)  T(F): 98 (10 Jul 2020 08:03), Max: 98.2 (09 Jul 2020 15:52)  HR: 91 (10 Jul 2020 08:03) (79 - 91)  BP: 167/83 (10 Jul 2020 08:03) (111/62 - 167/83)  BP(mean): --  RR: 18 (10 Jul 2020 08:03) (18 - 18)  SpO2: 96% (10 Jul 2020 08:03) (94% - 96%)    PHYSICAL EXAM:  GENERAL: Debilitated  ENMT: Dry mucous membranes  NECK: Supple, No JVD  NERVOUS SYSTEM:  Awake and alert, non focal  CHEST/LUNG: Clear bilaterally  HEART: Regular rate and rhythm; No rub  ABDOMEN: Soft, Nontender, Nondistended; BS+  EXTREMITIES:  2+ Peripheral Pulses, No LE edema  SKIN: No rashes or lesions    LABS:                        9.8    7.44  )-----------( 227      ( 10 Jul 2020 07:16 )             29.2     07-10    132<L>  |  98  |  21.0<H>  ----------------------------<  94  3.8   |  22.0  |  0.92    Creatinine, Serum: 0.73 mg/dL (07.09.20 @ 10:00)    Sodium, Serum: 130 mmol/L (07.09.20 @ 10:00)        Ca    8.6      10 Jul 2020 07:16              RADIOLOGY & ADDITIONAL TESTS:
Patient is a 89y old  Male who presents with a chief complaint of carmelo; rhabdomyolysis (2020 12:23)    Patient seen and examined at bedside.     ALLERGIES:  IV Iodine dye (Hypotension)  No Known Drug Allergies    MEDICATIONS  (STANDING):  amLODIPine   Tablet 2.5 milliGRAM(s) Oral daily  aspirin enteric coated 81 milliGRAM(s) Oral daily  enoxaparin Injectable 40 milliGRAM(s) SubCutaneous daily  losartan 50 milliGRAM(s) Oral daily  multivitamin 1 Tablet(s) Oral daily  polyethylene glycol 3350 17 Gram(s) Oral daily  sodium chloride 0.9%. 1000 milliLiter(s) (125 mL/Hr) IV Continuous <Continuous>    MEDICATIONS  (PRN):  senna 2 Tablet(s) Oral at bedtime PRN Constipation    Vital Signs Last 24 Hrs  T(F): 98 (2020 08:11), Max: 98.3 (2020 22:50)  HR: 97 (2020 08:11) (88 - 107)  BP: 150/82 (2020 06:16) (150/82 - 182/83)  RR: 18 (2020 08:11) (18 - 20)  SpO2: 96% (2020 08:11) (96% - 99%)  I&O's Summary    2020 07:01  -  2020 07:00  --------------------------------------------------------  IN: 1125 mL / OUT: 0 mL / NET: 1125 mL      PHYSICAL EXAM:  General: NAD, Alert, elderly  ENT: MMM, no thrush  Neck: Supple, No JVD  Lungs: Clear to auscultation bilaterally, good air entry, non-labored breathing  Cardio: S1/S2+; no pitting edema   Abdomen: Soft, Nontender, Nondistended; Bowel sounds present  Extremities: No calf tenderness  LABS:                        10.0   8.39  )-----------( 237      ( 2020 07:04 )             29.9     07-08    130  |  95  |  21.0  ----------------------------<  102  4.4   |  22.0  |  0.83    Ca    8.5      2020 07:04    eGFR if Non African American: 76 mL/min/1.73M2 (20 @ 07:04)  eGFR if : 89 mL/min/1.73M2 (20 @ 07:04)    CARDIAC MARKERS ( 2020 07:04 )  x     / x     / 900 U/L / x     / 21.9 ng/mL  CARDIAC MARKERS ( 2020 07:42 )  x     / x     / 1225 U/L / x     / 25.0 ng/mL  CARDIAC MARKERS ( 2020 13:17 )  x     / x     / 1240 U/L / x     / 19.6 ng/mL  CARDIAC MARKERS ( 2020 23:25 )  x     / x     / 1851 U/L / x     / 26.5 ng/mL    TSH 2.67   TSH with FT4 reflex --  Total T3 --    Urinalysis Basic - ( 2020 12:21 )  Color: Yellow / Appearance: Clear / S.020 / pH: x  Gluc: x / Ketone: Small  / Bili: Negative / Urobili: Negative mg/dL   Blood: x / Protein: 100 mg/dL / Nitrite: Negative   Leuk Esterase: Negative / RBC: 0-2 /HPF / WBC 0-2   Sq Epi: x / Non Sq Epi: Occasional / Bacteria: Occasional    cultures  Culture - Urine (collected 2020 17:27)  Source: .Urine Clean Catch (Midstream)  Final Report (2020 15:44):    No growth    RADIOLOGY & ADDITIONAL TESTS:  < from: Xray Hip 3-4 Views, Bilateral (20 @ 16:08) >  Impression:  No evidence of fracture or dislocation of either hip..  < end of copied text >    < from: Xray Chest 2 Views PA/Lat (20 @ 12:20) >  IMPRESSION: No acute finding or change.  < end of copied text >    < from: CT head and  Cervical Spine No Cont (20 @ 23:16) >  IMPRESSION:  1. scalp hematoma without acute intracranial CT abnormality.  2. No cervical spine fracture or traumatic malalignment.  < end of copied text >    < from: Xray Humerus, wrist, forearm and Elbow AP + Lateral + Oblique, Right (20 @ 23:14) >  IMPRESSION: Multijoint degeneration. No fracture.  < end of copied text >
Patient is a 89y old  Male who presents with a chief complaint of carmelo; rhabdomyolysis (2020 14:46)      Patient seen and examined at bedside. No overnight events reported.     ALLERGIES:  IV Iodine dye (Hypotension)  No Known Drug Allergies    MEDICATIONS  (STANDING):  amLODIPine   Tablet 2.5 milliGRAM(s) Oral daily  aspirin enteric coated 81 milliGRAM(s) Oral daily  enoxaparin Injectable 40 milliGRAM(s) SubCutaneous daily  losartan 50 milliGRAM(s) Oral daily  multivitamin 1 Tablet(s) Oral daily  polyethylene glycol 3350 17 Gram(s) Oral daily  sodium chloride 0.9%. 1000 milliLiter(s) (150 mL/Hr) IV Continuous <Continuous>    MEDICATIONS  (PRN):  senna 2 Tablet(s) Oral at bedtime PRN Constipation    Vital Signs Last 24 Hrs  T(F): 98 (2020 08:50), Max: 98 (2020 08:50)  HR: 90 (2020 08:50) (75 - 104)  BP: 165/82 (2020 08:50) (156/84 - 188/92)  RR: 20 (2020 08:50) (19 - 20)  SpO2: 94% (2020 08:50) (94% - 96%)  I&O's Summary    2020 07:01  -  2020 07:00  --------------------------------------------------------  IN: 900 mL / OUT: 0 mL / NET: 900 mL      PHYSICAL EXAM:  General: NAD, Alert, elderly  ENT: MMM, no thrush  Neck: Supple, No JVD  Lungs: Clear to auscultation bilaterally, good air entry, non-labored breathing  Cardio: S1/S2+; no pitting edema   Abdomen: Soft, Nontender, Nondistended; Bowel sounds present  Extremities: No calf tenderness    LABS:                        10.0   8.39  )-----------( 237      ( 2020 07:04 )             29.9     07-09    130  |  94  |  17.0  ----------------------------<  99  4.1   |  24.0  |  0.73    Ca    8.3      2020 10:00    eGFR if : 93 mL/min/1.73M2 (20 @ 10:00)  eGFR if Non African American: 81 mL/min/1.73M2 (20 @ 10:00)    CARDIAC MARKERS ( 2020 10:00 )  x     / x     / 505 U/L / x     / 13.5 ng/mL  CARDIAC MARKERS ( 2020 07:04 )  x     / x     / 900 U/L / x     / 21.9 ng/mL  CARDIAC MARKERS ( 2020 07:42 )  x     / x     / 1225 U/L / x     / 25.0 ng/mL  CARDIAC MARKERS ( 2020 13:17 )  x     / x     / 1240 U/L / x     / 19.6 ng/mL    Urinalysis Basic - ( 2020 12:21 )  Color: Yellow / Appearance: Clear / S.020 / pH: x  Gluc: x / Ketone: Small  / Bili: Negative / Urobili: Negative mg/dL   Blood: x / Protein: 100 mg/dL / Nitrite: Negative   Leuk Esterase: Negative / RBC: 0-2 /HPF / WBC 0-2   Sq Epi: x / Non Sq Epi: Occasional / Bacteria: Occasional    Cultures  Culture - Urine (collected 2020 17:27)  Source: .Urine Clean Catch (Midstream)  Final Report (2020 15:44):    No growth      RADIOLOGY & ADDITIONAL TESTS:  < from: Xray Hip 3-4 Views, Bilateral (20 @ 16:08) >  Impression:  No evidence of fracture or dislocation of either hip..  < end of copied text >    < from: Xray Chest 2 Views PA/Lat (20 @ 12:20) >  IMPRESSION: No acute finding or change.  < end of copied text >    < from: CT head and  Cervical Spine No Cont (20 @ 23:16) >  IMPRESSION:  1. scalp hematoma without acute intracranial CT abnormality.  2. No cervical spine fracture or traumatic malalignment.  < end of copied text >    < from: Xray Humerus, wrist, forearm and Elbow AP + Lateral + Oblique, Right (20 @ 23:14) >  IMPRESSION: Multijoint degeneration. No fracture.  < end of copied text >

## 2020-07-10 NOTE — PROGRESS NOTE ADULT - REASON FOR ADMISSION
carmelo; rhabdomyolysis

## 2020-07-10 NOTE — DIETITIAN INITIAL EVALUATION ADULT. - PERTINENT LABORATORY DATA
07-10 Na132 mmol/L<L> Glu 94 mg/dL K+ 3.8 mmol/L Cr  0.92 mg/dL BUN 21.0 mg/dL<H> Phos n/a   Alb n/a   PAB n/a

## 2020-07-10 NOTE — PROGRESS NOTE ADULT - ASSESSMENT
89 year old male with pmh of basal cell cancer, and prostate cancer coming to hospital after being brought in by son s/p fall. Patient states he fell over one step and remembers event, denies LOC. Pt's son states he is unsure how long father was on floor. States father has been having constipation and hip pain for past few weeks. States has been having some memory issues as well but has not followed up with neuro yet.     #s/p fall - mechanical- present on admission  - w/ ams likely 2/2 hyponatremia, dehydration and probable dementia present on admission  - Xrays negative for acute fracture/dislocation  - Hip pain being worked up outpatient by Dr Salvador, was to have MRI on Wednesday   - PT reeval    #Rhabdomyolysis - resolved   - s/p ivf    #Hyponatremia - improved  - Likely secondary to dehydration w/ rhabdo  - monitor na  - Nephro consult appreciated   - fluid restriction oral    #Dementia?  - To f/u outpatient w/ neuro    #HTN - essential   - Noted w/ elevated BPs  - Losartan, norvasc  - Monitor BP    #s/p prostate and basal cell cancer  - Follow up w/ heme onc outpatient     #Constipation  - Continue with current bowel regimen    #DVT prophylaxis  - Lovenox SC     case d/w patient son Donn [phone] via facetime -> hospital course to date reviewed all questions answered; pt reeval pending

## 2020-07-10 NOTE — PROGRESS NOTE ADULT - ASSESSMENT
Hyponatremia: resolving  + hypovolemia  - oral fluid restrict  - avoid excessive hypotonic fluids  - monitor labs

## 2020-07-10 NOTE — DIETITIAN INITIAL EVALUATION ADULT. - ADD RECOMMEND
RX: Add Ensure Pudding TID to optimize po intake and provide an additional 170kcal, 4g protein per serving. Continue MVI daily. Continue assistance with meals. Monitor wts and labs.

## 2020-07-10 NOTE — DIETITIAN INITIAL EVALUATION ADULT. - MALNUTRITION
NFPE performed. Severe muscle wasting at temples, clavicle, shoulder. Severe fat loss in orbital region, buccal pads, triceps. Severe chronic

## 2020-07-10 NOTE — DISCHARGE NOTE NURSING/CASE MANAGEMENT/SOCIAL WORK - PATIENT PORTAL LINK FT
You can access the FollowMyHealth Patient Portal offered by [facility] by registering at the following website: [email or web address]. By joining [facility]’s FollowMyHealth portal, you will also be able to view your health information using other applications (apps) compatible with our system.

## 2020-07-10 NOTE — CHART NOTE - NSCHARTNOTEFT_GEN_A_CORE
Upon Nutritional Assessment by the Registered Dietitian your patient was determined to meet criteria / has evidence of the following diagnosis/diagnoses:          [ x] Severe Protein Calorie Malnutrition      Findings as based on:  •  Comprehensive nutrition assessment and consultation  •  Calorie counts (nutrient intake analysis)  •  Food acceptance and intake status from observations by staff  •  Follow up  •  Patient education  •  Intervention secondary to interdisciplinary rounds  •   concerns      Treatment:    The following diet has been recommended:      PROVIDER Section:     By signing this assessment you are acknowledging and agree with the diagnosis/diagnoses assigned by the Registered Dietitian    Comments:    RX: Add Ensure Pudding TID to optimize po intake and provide an additional 170kcal, 4g protein per serving

## 2020-07-10 NOTE — DIETITIAN INITIAL EVALUATION ADULT. - ETIOLOGY
related to inability to meet sufficient protein-energy in setting of s/p prostate and basal cell cancer, dementia, advanced age

## 2023-12-03 NOTE — SWALLOW BEDSIDE ASSESSMENT ADULT - ORAL PREPARATORY PHASE
Within functional limits Reduced oral grading/Lateral loss of bolus/Anterior loss of bolus same name as above